# Patient Record
Sex: MALE | Race: WHITE | Employment: OTHER | URBAN - METROPOLITAN AREA
[De-identification: names, ages, dates, MRNs, and addresses within clinical notes are randomized per-mention and may not be internally consistent; named-entity substitution may affect disease eponyms.]

---

## 2022-11-14 ENCOUNTER — OFFICE VISIT (OUTPATIENT)
Dept: UROLOGY | Age: 87
End: 2022-11-14
Payer: MEDICARE

## 2022-11-14 DIAGNOSIS — N20.1 LEFT URETERAL STONE: Primary | ICD-10-CM

## 2022-11-14 DIAGNOSIS — N40.1 BPH WITH OBSTRUCTION/LOWER URINARY TRACT SYMPTOMS: ICD-10-CM

## 2022-11-14 DIAGNOSIS — N13.8 BPH WITH OBSTRUCTION/LOWER URINARY TRACT SYMPTOMS: ICD-10-CM

## 2022-11-14 DIAGNOSIS — N20.0 RENAL STONES: ICD-10-CM

## 2022-11-14 DIAGNOSIS — N39.0 RECURRENT UTI: ICD-10-CM

## 2022-11-14 LAB
BILIRUBIN, URINE, POC: NEGATIVE
BLOOD URINE, POC: NORMAL
GLUCOSE URINE, POC: 100
KETONES, URINE, POC: NEGATIVE
LEUKOCYTE ESTERASE, URINE, POC: NORMAL
NITRITE, URINE, POC: NEGATIVE
PH, URINE, POC: 7 (ref 4.6–8)
PROTEIN,URINE, POC: 100
SPECIFIC GRAVITY, URINE, POC: 1.02 (ref 1–1.03)
URINALYSIS CLARITY, POC: NORMAL
URINALYSIS COLOR, POC: NORMAL
UROBILINOGEN, POC: 0.2

## 2022-11-14 PROCEDURE — 81003 URINALYSIS AUTO W/O SCOPE: CPT | Performed by: UROLOGY

## 2022-11-14 PROCEDURE — 74018 RADEX ABDOMEN 1 VIEW: CPT | Performed by: UROLOGY

## 2022-11-14 PROCEDURE — 99204 OFFICE O/P NEW MOD 45 MIN: CPT | Performed by: UROLOGY

## 2022-11-14 PROCEDURE — 1123F ACP DISCUSS/DSCN MKR DOCD: CPT | Performed by: UROLOGY

## 2022-11-14 RX ORDER — TAMSULOSIN HYDROCHLORIDE 0.4 MG/1
CAPSULE ORAL
COMMUNITY
Start: 2022-11-02 | End: 2022-11-30

## 2022-11-14 RX ORDER — FINASTERIDE 5 MG/1
5 TABLET, FILM COATED ORAL DAILY
Qty: 90 TABLET | Refills: 3 | Status: SHIPPED | OUTPATIENT
Start: 2022-11-14

## 2022-11-14 RX ORDER — NITROFURANTOIN MACROCRYSTALS 50 MG/1
50 CAPSULE ORAL DAILY
Qty: 14 CAPSULE | Refills: 1 | Status: SHIPPED | OUTPATIENT
Start: 2022-11-14 | End: 2022-11-30

## 2022-11-14 ASSESSMENT — ENCOUNTER SYMPTOMS
EYES NEGATIVE: 1
RESPIRATORY NEGATIVE: 1

## 2022-11-14 NOTE — H&P (VIEW-ONLY)
[facility]  [address] [facility]    [address]  [facility], [address] [facility]  [phone]          Юлия Vergara  : 1935    Chief Complaint   Patient presents with    Nephrolithiasis          HPI     Юлия Vergara is a 80 y.o. male with h/o bph and recurrent uti's who presented to [facility] 10/28/22 and was found to have UTI and 5 m LEFT proximal ureteral stone. LEFT ureteral stent was placed urgency 10/29/22 and he is currently scheduled for cystoscopy, LEFT ureteroscopy, laser lithotripsy, LEFT ureteral stent exchange with Dr. Meghna Gomez on 22. History reviewed. No pertinent past medical history. History reviewed. No pertinent surgical history. Current Outpatient Medications   Medication Sig Dispense Refill    tamsulosin (FLOMAX) 0.4 MG capsule       finasteride (PROSCAR) 5 MG tablet Take 1 tablet by mouth daily 90 tablet 3    nitrofurantoin (MACRODANTIN) 50 MG capsule Take 1 capsule by mouth daily 14 capsule 1    amLODIPine (NORVASC) 10 MG tablet Take by mouth daily      losartan (COZAAR) 50 MG tablet TAKE 1 TABLET BY MOUTH EVERY DAY      lisinopril (PRINIVIL;ZESTRIL) 10 MG tablet Take by mouth daily (Patient not taking: Reported on 2022)       No current facility-administered medications for this visit.      Not on File  Social History     Socioeconomic History    Marital status:      Spouse name: Not on file    Number of children: Not on file    Years of education: Not on file    Highest education level: Not on file   Occupational History    Not on file   Tobacco Use    Smoking status: Not on file    Smokeless tobacco: Not on file   Substance and Sexual Activity    Alcohol use: Not on file    Drug use: Not on file    Sexual activity: Not on file   Other Topics Concern    Not on file   Social History Narrative    Not on file     Social Determinants of Health     Financial Resource Strain: Not on file   Food Insecurity: Not on file   Transportation Needs: Not on file   Physical Activity: Not on file   Stress: Not on file   Social Connections: Not on file   Intimate Partner Violence: Not on file   Housing Stability: Not on file     History reviewed. No pertinent family history. Review of Systems  Constitutional: Negative  Skin: Negative skin ROS  Eyes: Eyes negative  ENT:  Negative for high frequency hearing loss. Respiratory: Respiratory negative  Cardiovascular: Positive for chest pain. GI: Neg GI ROS  Genitourinary: Positive for recurrent UTIs, nocturia, slower stream, urgency, leakage w/ urge and incomplete emptying. Musculoskeletal: Musculoskeletal negative  Neurological: Positive for dizziness. Psychological: Neg psych ROS  Endocrine: Endocrine negative  Hem/Lymphatic: Positive for frequent infections. Urinalysis  UA - Dipstick  Results for orders placed or performed in visit on 11/14/22   AMB POC URINALYSIS DIP STICK AUTO W/O MICRO   Result Value Ref Range    Color, Urine, POC      Clarity, Urine, POC      Glucose, Urine,  Negative    Bilirubin, Urine, POC Negative Negative    Ketones, Urine, POC Negative Negative    Specific Gravity, Urine, POC 1.020 1.001 - 1.035    Blood, Urine, POC Moderate Negative    pH, Urine, POC 7.0 4.6 - 8.0    Protein, Urine,  Negative    Urobilinogen, POC 0.2     Nitrate, Urine, POC Negative Negative    Leukocyte Esterase, Urine, POC Trace Negative       There were no vitals taken for this visit. GENERAL: NAD, ALERT, A&O x 3, GAIT NORMAL  CARDIAC: regular rate and rhythm  CHEST AND LUNG: Easy work of breathing, clear to auscultation bilaterally  ABDOMEN: soft, non tender, non distended, + bowel sounds, no organomegaly, no palpable masses  NEUROLOGIC: cranial nerves 2-12 grossly intact     KUB: 5 mm LLP stone and L stent        Assessment and Plan    ICD-10-CM    1. Left ureteral stone  N20.1       2.  Renal stones  N20.0 AMB POC URINALYSIS DIP STICK AUTO W/O MICRO     KUB - POC for Rogerio ONLY      3. BPH with obstruction/lower urinary tract symptoms  N40.1     N13.8       4. Recurrent UTI  N39.0           In regards to his stone, it appears to have moved back into the LLP. He will be scheduled for cystoscopy, LEFT ureteroscopy, laser lithotripsy, LEFT ureteral stent exchange. At that time, prostate can be evaluated as well for potential future TURP. He will begin finasteride today. In regards to recurrent UTI, he will begin 50 mg macrodantin to be taken until cystoscopy, LEFT ureteroscopy, laser lithotripsy, LEFT ureteral stent exchange can be performed.

## 2022-11-14 NOTE — PROGRESS NOTES
[facility]  [address] [place] [address]  [place], [address] [facility]  [phone]          Cullen Johnston  : 1935    Chief Complaint   Patient presents with    Nephrolithiasis          HPI     Cullen Johnston is a 80 y.o. male with h/o bph and recurrent uti's who presented to [facility] 10/28/22 and was found to have UTI and 5 m LEFT proximal ureteral stone. LEFT ureteral stent was placed urgency 10/29/22 and he is currently scheduled for cystoscopy, LEFT ureteroscopy, laser lithotripsy, LEFT ureteral stent exchange with Dr. Francheska Hinson on 22. History reviewed. No pertinent past medical history. History reviewed. No pertinent surgical history. Current Outpatient Medications   Medication Sig Dispense Refill    tamsulosin (FLOMAX) 0.4 MG capsule       finasteride (PROSCAR) 5 MG tablet Take 1 tablet by mouth daily 90 tablet 3    nitrofurantoin (MACRODANTIN) 50 MG capsule Take 1 capsule by mouth daily 14 capsule 1    amLODIPine (NORVASC) 10 MG tablet Take by mouth daily      losartan (COZAAR) 50 MG tablet TAKE 1 TABLET BY MOUTH EVERY DAY      lisinopril (PRINIVIL;ZESTRIL) 10 MG tablet Take by mouth daily (Patient not taking: Reported on 2022)       No current facility-administered medications for this visit.      Not on File  Social History     Socioeconomic History    Marital status:      Spouse name: Not on file    Number of children: Not on file    Years of education: Not on file    Highest education level: Not on file   Occupational History    Not on file   Tobacco Use    Smoking status: Not on file    Smokeless tobacco: Not on file   Substance and Sexual Activity    Alcohol use: Not on file    Drug use: Not on file    Sexual activity: Not on file   Other Topics Concern    Not on file   Social History Narrative    Not on file     Social Determinants of Health     Financial Resource Strain: Not on file   Food Insecurity: Not on file   Transportation Needs: Not on file   Physical Activity: Not on file   Stress: Not on file   Social Connections: Not on file   Intimate Partner Violence: Not on file   Housing Stability: Not on file     History reviewed. No pertinent family history. Review of Systems  Constitutional: Negative  Skin: Negative skin ROS  Eyes: Eyes negative  ENT:  Negative for high frequency hearing loss. Respiratory: Respiratory negative  Cardiovascular: Positive for chest pain. GI: Neg GI ROS  Genitourinary: Positive for recurrent UTIs, nocturia, slower stream, urgency, leakage w/ urge and incomplete emptying. Musculoskeletal: Musculoskeletal negative  Neurological: Positive for dizziness. Psychological: Neg psych ROS  Endocrine: Endocrine negative  Hem/Lymphatic: Positive for frequent infections. Urinalysis  UA - Dipstick  Results for orders placed or performed in visit on 11/14/22   AMB POC URINALYSIS DIP STICK AUTO W/O MICRO   Result Value Ref Range    Color, Urine, POC      Clarity, Urine, POC      Glucose, Urine,  Negative    Bilirubin, Urine, POC Negative Negative    Ketones, Urine, POC Negative Negative    Specific Gravity, Urine, POC 1.020 1.001 - 1.035    Blood, Urine, POC Moderate Negative    pH, Urine, POC 7.0 4.6 - 8.0    Protein, Urine,  Negative    Urobilinogen, POC 0.2     Nitrate, Urine, POC Negative Negative    Leukocyte Esterase, Urine, POC Trace Negative       There were no vitals taken for this visit. GENERAL: NAD, ALERT, A&O x 3, GAIT NORMAL  CARDIAC: regular rate and rhythm  CHEST AND LUNG: Easy work of breathing, clear to auscultation bilaterally  ABDOMEN: soft, non tender, non distended, + bowel sounds, no organomegaly, no palpable masses  NEUROLOGIC: cranial nerves 2-12 grossly intact     KUB: 5 mm LLP stone and L stent        Assessment and Plan    ICD-10-CM    1. Left ureteral stone  N20.1       2.  Renal stones  N20.0 AMB POC URINALYSIS DIP STICK AUTO W/O MICRO     KUB - POC for Rogerio ONLY      3. BPH with obstruction/lower urinary tract symptoms  N40.1     N13.8       4. Recurrent UTI  N39.0           In regards to his stone, it appears to have moved back into the LLP. He will be scheduled for cystoscopy, LEFT ureteroscopy, laser lithotripsy, LEFT ureteral stent exchange. At that time, prostate can be evaluated as well for potential future TURP. He will begin finasteride today. In regards to recurrent UTI, he will begin 50 mg macrodantin to be taken until cystoscopy, LEFT ureteroscopy, laser lithotripsy, LEFT ureteral stent exchange can be performed.

## 2022-11-21 ENCOUNTER — TELEPHONE (OUTPATIENT)
Dept: UROLOGY | Age: 87
End: 2022-11-21

## 2022-11-21 NOTE — TELEPHONE ENCOUNTER
----- Message from Ynes Granados MD sent at 11/14/2022  2:15 PM EST -----  Regarding: schedule  Hospital: [address] [place] [address]     Surgeon: donavan  Assist: NONE    Diagnosis: LLP renal stone    Procedure: cystoscopy, LEFT ureteroscopy, laser lithotripsy, LEFT ureteral stent exchange    Posting time: 1 hour    Special Instruments Needed:  NONE    Anesthesia: GENERAL    Labs: CBC, BMP    Tests: EKG per anesthesia    Blood: NONE    Bowel Prep: NONE    Special Instructions:     Orders/HandP: done/done    Follow up appointment: 5-7 days after with me with kub.

## 2022-11-28 ENCOUNTER — TELEPHONE (OUTPATIENT)
Dept: UROLOGY | Age: 87
End: 2022-11-28

## 2022-11-28 PROBLEM — N20.0 KIDNEY STONE: Status: ACTIVE | Noted: 2022-11-28

## 2022-11-28 NOTE — TELEPHONE ENCOUNTER
The patient is asking If he will have the antibiotic refilled and also if you are going to order a urine and culture to be done before surgery as he states he has chronic infections and wants to make ok to have surgery this Friday 12/2

## 2022-11-28 NOTE — TELEPHONE ENCOUNTER
Procedures: Procedure(s):   CYSTOSCOPY,LEFT URETEROSCOPY, LASER LITHOTRIPSY, LEFT URETERAL STENT PLACEMENT   Date: 12/2/2022   Time: 1038   Location: [facility] MAIN OR 01 CYSTO

## 2022-11-29 ENCOUNTER — OFFICE VISIT (OUTPATIENT)
Dept: UROLOGY | Age: 87
End: 2022-11-29
Payer: MEDICARE

## 2022-11-29 DIAGNOSIS — N39.0 RECURRENT UTI: Primary | ICD-10-CM

## 2022-11-29 LAB
BILIRUBIN, URINE, POC: NEGATIVE
BLOOD URINE, POC: NORMAL
GLUCOSE URINE, POC: 100
KETONES, URINE, POC: NEGATIVE
LEUKOCYTE ESTERASE, URINE, POC: NORMAL
NITRITE, URINE, POC: NEGATIVE
PH, URINE, POC: 5.5 (ref 4.6–8)
PROTEIN,URINE, POC: 30
SPECIFIC GRAVITY, URINE, POC: 1.01 (ref 1–1.03)
URINALYSIS CLARITY, POC: NORMAL
URINALYSIS COLOR, POC: NORMAL
UROBILINOGEN, POC: NORMAL

## 2022-11-29 PROCEDURE — 81003 URINALYSIS AUTO W/O SCOPE: CPT | Performed by: UROLOGY

## 2022-11-29 RX ORDER — CIPROFLOXACIN 500 MG/1
500 TABLET, FILM COATED ORAL 2 TIMES DAILY
Qty: 14 TABLET | Refills: 0 | Status: SHIPPED | OUTPATIENT
Start: 2022-11-29 | End: 2022-11-30

## 2022-11-29 NOTE — PROGRESS NOTES
UA - Dipstick  Results for orders placed or performed in visit on 11/29/22   AMB POC URINALYSIS DIP STICK AUTO W/O MICRO   Result Value Ref Range    Color (UA POC)      Clarity (UA POC)      Glucose, Urine,  Negative    Bilirubin, Urine, POC Negative Negative    KETONES, Urine, POC Negative Negative    Specific Gravity, Urine, POC 1.015 1.001 - 1.035    Blood (UA POC) Small Negative    pH, Urine, POC 5.5 4.6 - 8.0    Protein, Urine, POC 30 Negative    Urobilinogen, POC Normal     Nitrite, Urine, POC Negative Negative    Leukocyte Esterase, Urine, POC Small Negative   Results for orders placed or performed in visit on 11/14/22   AMB POC URINALYSIS DIP STICK AUTO W/O MICRO   Result Value Ref Range    Color, Urine, POC      Clarity, Urine, POC      Glucose, Urine,  Negative    Bilirubin, Urine, POC Negative Negative    Ketones, Urine, POC Negative Negative    Specific Gravity, Urine, POC 1.020 1.001 - 1.035    Blood, Urine, POC Moderate Negative    pH, Urine, POC 7.0 4.6 - 8.0    Protein, Urine,  Negative    Urobilinogen, POC 0.2     Nitrate, Urine, POC Negative Negative    Leukocyte Esterase, Urine, POC Trace Negative       UA - Micro  WBC - 5-10  RBC - 0  Bacteria - 0  Epith - 0      Requested Prescriptions      No prescriptions requested or ordered in this encounter     Plan    Diagnosis Orders   1. Recurrent UTI  AMB POC URINALYSIS DIP STICK AUTO W/O MICRO        Urine culture was sent and cipro eprescribed. No change to surgical plan.

## 2022-11-30 RX ORDER — CIPROFLOXACIN 500 MG/1
500 TABLET, FILM COATED ORAL 2 TIMES DAILY
Qty: 14 TABLET | Refills: 0 | Status: SHIPPED | OUTPATIENT
Start: 2022-11-30 | End: 2022-12-07

## 2022-11-30 NOTE — PRE-PROCEDURE INSTRUCTIONS
Patient verified name and . Order for consent was found in EHR and matches case posting; patient verifies procedure. Type IB surgery, phone assessment complete. Orders were received. Labs per surgeon: none found for PAT. Labs per anesthesia protocol: POC Potassium DOS. Patient answered medical/surgical history questions at their best of ability. All prior to admission medications documented in [facility]. Patient instructed to take the following medications the day of surgery according to anesthesia guidelines with a small sip of water: Amlodipine, Nitrofuantoin, Finasteride. On the day before surgery please take Acetaminophen 1000mg in the morning and then again before bed. You may substitute for Tylenol 650 mg. Hold all vitamins 7 days prior to surgery and NSAIDS 5 days prior to surgery. Prescription meds to hold:Losartan. Patient instructed on the following:    > Arrive at [facility], time of arrival to be called the day before by 1700  > NPO after midnight, unless otherwise indicated, including gum, mints, and ice chips  > Responsible adult must drive patient to the hospital, stay during surgery, and patient will need supervision 24 hours after anesthesia  > Use antibacterial soap in shower the night before surgery and on the morning of surgery  > All piercings must be removed prior to arrival.    > Leave all valuables (money and jewelry) at home but bring insurance card and ID on DOS.   > You may be required to pay a deductible or co-pay on the day of your procedure. You can pre-pay by calling [phone] if your surgery is at the [facility] or [phone] if your surgery is at the [facility]. > Do not wear make-up, nail polish, lotions, cologne, perfumes, powders, or oil on skin. Artificial nails are not permitted.

## 2022-12-01 ENCOUNTER — ANESTHESIA EVENT (OUTPATIENT)
Dept: SURGERY | Age: 87
End: 2022-12-01

## 2022-12-01 NOTE — PROGRESS NOTES
Pre-procedure call completed. Instructed to arrive at [place] [address] on 12/02/2022 for scheduled procedure. Instructed to remain NPO after MN.

## 2022-12-02 ENCOUNTER — ANESTHESIA (OUTPATIENT)
Dept: SURGERY | Age: 87
End: 2022-12-02

## 2022-12-02 ENCOUNTER — HOSPITAL ENCOUNTER (OUTPATIENT)
Age: 87
Setting detail: OUTPATIENT SURGERY
Discharge: HOME | End: 2022-12-02
Attending: UROLOGY | Admitting: UROLOGY
Payer: MEDICARE

## 2022-12-02 VITALS
RESPIRATION RATE: 16 BRPM | DIASTOLIC BLOOD PRESSURE: 91 MMHG | WEIGHT: 191.38 LBS | HEART RATE: 55 BPM | HEIGHT: 68 IN | BODY MASS INDEX: 29.01 KG/M2 | OXYGEN SATURATION: 97 % | SYSTOLIC BLOOD PRESSURE: 157 MMHG | TEMPERATURE: 98.1 F

## 2022-12-02 DIAGNOSIS — N20.0 KIDNEY STONE: ICD-10-CM

## 2022-12-02 LAB
ANION GAP SERPL CALC-SCNC: 5 MMOL/L (ref 2–11)
BUN SERPL-MCNC: 48 MG/DL (ref 8–23)
CALCIUM SERPL-MCNC: 8.9 MG/DL (ref 8.3–10.4)
CHLORIDE SERPL-SCNC: 112 MMOL/L (ref 101–110)
CO2 SERPL-SCNC: 24 MMOL/L (ref 21–32)
CREAT SERPL-MCNC: 2.6 MG/DL (ref 0.8–1.5)
ERYTHROCYTE [DISTWIDTH] IN BLOOD BY AUTOMATED COUNT: 14 % (ref 11.9–14.6)
GLUCOSE BLD STRIP.AUTO-MCNC: 137 MG/DL (ref 65–100)
GLUCOSE SERPL-MCNC: 151 MG/DL (ref 65–100)
HCT VFR BLD AUTO: 41.3 % (ref 41.1–50.3)
HGB BLD-MCNC: 13.4 G/DL (ref 13.6–17.2)
MCH RBC QN AUTO: 29.3 PG (ref 26.1–32.9)
MCHC RBC AUTO-ENTMCNC: 32.4 G/DL (ref 31.4–35)
MCV RBC AUTO: 90.4 FL (ref 82–102)
NRBC # BLD: 0 K/UL (ref 0–0.2)
PLATELET # BLD AUTO: 197 K/UL (ref 150–450)
PMV BLD AUTO: 10.2 FL (ref 9.4–12.3)
POTASSIUM SERPL-SCNC: 4.5 MMOL/L (ref 3.5–5.1)
RBC # BLD AUTO: 4.57 M/UL (ref 4.23–5.6)
SERVICE CMNT-IMP: ABNORMAL
SODIUM SERPL-SCNC: 141 MMOL/L (ref 133–143)
WBC # BLD AUTO: 6.2 K/UL (ref 4.3–11.1)

## 2022-12-02 PROCEDURE — C1769 GUIDE WIRE: HCPCS | Performed by: UROLOGY

## 2022-12-02 PROCEDURE — 3700000001 HC ADD 15 MINUTES (ANESTHESIA): Performed by: UROLOGY

## 2022-12-02 PROCEDURE — C2617 STENT, NON-COR, TEM W/O DEL: HCPCS | Performed by: UROLOGY

## 2022-12-02 PROCEDURE — 6360000002 HC RX W HCPCS: Performed by: NURSE ANESTHETIST, CERTIFIED REGISTERED

## 2022-12-02 PROCEDURE — 2709999900 HC NON-CHARGEABLE SUPPLY: Performed by: UROLOGY

## 2022-12-02 PROCEDURE — 3600000014 HC SURGERY LEVEL 4 ADDTL 15MIN: Performed by: UROLOGY

## 2022-12-02 PROCEDURE — 7100000000 HC PACU RECOVERY - FIRST 15 MIN: Performed by: UROLOGY

## 2022-12-02 PROCEDURE — 2720000010 HC SURG SUPPLY STERILE: Performed by: UROLOGY

## 2022-12-02 PROCEDURE — 7100000010 HC PHASE II RECOVERY - FIRST 15 MIN: Performed by: UROLOGY

## 2022-12-02 PROCEDURE — 82962 GLUCOSE BLOOD TEST: CPT

## 2022-12-02 PROCEDURE — C1894 INTRO/SHEATH, NON-LASER: HCPCS | Performed by: UROLOGY

## 2022-12-02 PROCEDURE — 2580000003 HC RX 258: Performed by: ANESTHESIOLOGY

## 2022-12-02 PROCEDURE — 7100000011 HC PHASE II RECOVERY - ADDTL 15 MIN: Performed by: UROLOGY

## 2022-12-02 PROCEDURE — 3600000004 HC SURGERY LEVEL 4 BASE: Performed by: UROLOGY

## 2022-12-02 PROCEDURE — 85027 COMPLETE CBC AUTOMATED: CPT

## 2022-12-02 PROCEDURE — 6370000000 HC RX 637 (ALT 250 FOR IP): Performed by: ANESTHESIOLOGY

## 2022-12-02 PROCEDURE — 7100000001 HC PACU RECOVERY - ADDTL 15 MIN: Performed by: UROLOGY

## 2022-12-02 PROCEDURE — 3700000000 HC ANESTHESIA ATTENDED CARE: Performed by: UROLOGY

## 2022-12-02 PROCEDURE — 2500000003 HC RX 250 WO HCPCS: Performed by: NURSE ANESTHETIST, CERTIFIED REGISTERED

## 2022-12-02 PROCEDURE — 80048 BASIC METABOLIC PNL TOTAL CA: CPT

## 2022-12-02 PROCEDURE — 52356 CYSTO/URETERO W/LITHOTRIPSY: CPT | Performed by: UROLOGY

## 2022-12-02 PROCEDURE — 6360000002 HC RX W HCPCS: Performed by: UROLOGY

## 2022-12-02 DEVICE — URETERAL STENT
Type: IMPLANTABLE DEVICE | Site: URETER | Status: FUNCTIONAL
Brand: PERCUFLEX™ PLUS

## 2022-12-02 RX ORDER — SODIUM CHLORIDE 9 MG/ML
INJECTION, SOLUTION INTRAVENOUS PRN
Status: DISCONTINUED | OUTPATIENT
Start: 2022-12-02 | End: 2022-12-02 | Stop reason: HOSPADM

## 2022-12-02 RX ORDER — ACETAMINOPHEN 500 MG
1000 TABLET ORAL ONCE
Status: COMPLETED | OUTPATIENT
Start: 2022-12-02 | End: 2022-12-02

## 2022-12-02 RX ORDER — ONDANSETRON 2 MG/ML
INJECTION INTRAMUSCULAR; INTRAVENOUS PRN
Status: DISCONTINUED | OUTPATIENT
Start: 2022-12-02 | End: 2022-12-02 | Stop reason: SDUPTHER

## 2022-12-02 RX ORDER — ROCURONIUM BROMIDE 10 MG/ML
INJECTION, SOLUTION INTRAVENOUS PRN
Status: DISCONTINUED | OUTPATIENT
Start: 2022-12-02 | End: 2022-12-02 | Stop reason: SDUPTHER

## 2022-12-02 RX ORDER — LIDOCAINE HYDROCHLORIDE 20 MG/ML
INJECTION, SOLUTION EPIDURAL; INFILTRATION; INTRACAUDAL; PERINEURAL PRN
Status: DISCONTINUED | OUTPATIENT
Start: 2022-12-02 | End: 2022-12-02 | Stop reason: SDUPTHER

## 2022-12-02 RX ORDER — SODIUM CHLORIDE, SODIUM LACTATE, POTASSIUM CHLORIDE, CALCIUM CHLORIDE 600; 310; 30; 20 MG/100ML; MG/100ML; MG/100ML; MG/100ML
INJECTION, SOLUTION INTRAVENOUS CONTINUOUS
Status: DISCONTINUED | OUTPATIENT
Start: 2022-12-02 | End: 2022-12-02 | Stop reason: HOSPADM

## 2022-12-02 RX ORDER — OXYCODONE HYDROCHLORIDE 5 MG/1
5 TABLET ORAL PRN
Status: DISCONTINUED | OUTPATIENT
Start: 2022-12-02 | End: 2022-12-02 | Stop reason: HOSPADM

## 2022-12-02 RX ORDER — HYDROMORPHONE HCL 110MG/55ML
0.25 PATIENT CONTROLLED ANALGESIA SYRINGE INTRAVENOUS EVERY 5 MIN PRN
Status: DISCONTINUED | OUTPATIENT
Start: 2022-12-02 | End: 2022-12-02 | Stop reason: HOSPADM

## 2022-12-02 RX ORDER — NEOSTIGMINE METHYLSULFATE 1 MG/ML
INJECTION, SOLUTION INTRAVENOUS PRN
Status: DISCONTINUED | OUTPATIENT
Start: 2022-12-02 | End: 2022-12-02 | Stop reason: SDUPTHER

## 2022-12-02 RX ORDER — SODIUM CHLORIDE 0.9 % (FLUSH) 0.9 %
5-40 SYRINGE (ML) INJECTION PRN
Status: DISCONTINUED | OUTPATIENT
Start: 2022-12-02 | End: 2022-12-02 | Stop reason: HOSPADM

## 2022-12-02 RX ORDER — SODIUM CHLORIDE 0.9 % (FLUSH) 0.9 %
5-40 SYRINGE (ML) INJECTION EVERY 12 HOURS SCHEDULED
Status: DISCONTINUED | OUTPATIENT
Start: 2022-12-02 | End: 2022-12-02 | Stop reason: HOSPADM

## 2022-12-02 RX ORDER — PROPOFOL 10 MG/ML
INJECTION, EMULSION INTRAVENOUS PRN
Status: DISCONTINUED | OUTPATIENT
Start: 2022-12-02 | End: 2022-12-02 | Stop reason: SDUPTHER

## 2022-12-02 RX ORDER — LIDOCAINE HYDROCHLORIDE 10 MG/ML
1 INJECTION, SOLUTION INFILTRATION; PERINEURAL
Status: DISCONTINUED | OUTPATIENT
Start: 2022-12-02 | End: 2022-12-02 | Stop reason: HOSPADM

## 2022-12-02 RX ORDER — HYDROMORPHONE HCL 110MG/55ML
0.5 PATIENT CONTROLLED ANALGESIA SYRINGE INTRAVENOUS EVERY 5 MIN PRN
Status: DISCONTINUED | OUTPATIENT
Start: 2022-12-02 | End: 2022-12-02 | Stop reason: HOSPADM

## 2022-12-02 RX ORDER — ONDANSETRON 2 MG/ML
4 INJECTION INTRAMUSCULAR; INTRAVENOUS
Status: DISCONTINUED | OUTPATIENT
Start: 2022-12-02 | End: 2022-12-02 | Stop reason: HOSPADM

## 2022-12-02 RX ORDER — OXYCODONE HYDROCHLORIDE 5 MG/1
10 TABLET ORAL PRN
Status: DISCONTINUED | OUTPATIENT
Start: 2022-12-02 | End: 2022-12-02 | Stop reason: HOSPADM

## 2022-12-02 RX ORDER — GLYCOPYRROLATE 0.2 MG/ML
INJECTION INTRAMUSCULAR; INTRAVENOUS PRN
Status: DISCONTINUED | OUTPATIENT
Start: 2022-12-02 | End: 2022-12-02 | Stop reason: SDUPTHER

## 2022-12-02 RX ADMIN — SODIUM CHLORIDE, POTASSIUM CHLORIDE, SODIUM LACTATE AND CALCIUM CHLORIDE: 600; 310; 30; 20 INJECTION, SOLUTION INTRAVENOUS at 08:56

## 2022-12-02 RX ADMIN — GLYCOPYRROLATE 0.2 MG: 0.2 INJECTION, SOLUTION INTRAMUSCULAR; INTRAVENOUS at 11:23

## 2022-12-02 RX ADMIN — PROPOFOL 150 MG: 10 INJECTION, EMULSION INTRAVENOUS at 10:49

## 2022-12-02 RX ADMIN — LIDOCAINE HYDROCHLORIDE 80 MG: 20 INJECTION, SOLUTION EPIDURAL; INFILTRATION; INTRACAUDAL; PERINEURAL at 10:49

## 2022-12-02 RX ADMIN — GLYCOPYRROLATE 0.8 MG: 0.2 INJECTION, SOLUTION INTRAMUSCULAR; INTRAVENOUS at 11:21

## 2022-12-02 RX ADMIN — Medication 2 G: at 10:55

## 2022-12-02 RX ADMIN — ONDANSETRON 4 MG: 2 INJECTION INTRAMUSCULAR; INTRAVENOUS at 11:21

## 2022-12-02 RX ADMIN — Medication 5 MG: at 11:21

## 2022-12-02 RX ADMIN — ACETAMINOPHEN 1000 MG: 500 TABLET ORAL at 08:55

## 2022-12-02 RX ADMIN — FENTANYL CITRATE 50 MCG: 50 INJECTION INTRAMUSCULAR; INTRAVENOUS at 10:49

## 2022-12-02 RX ADMIN — ROCURONIUM BROMIDE 40 MG: 50 INJECTION, SOLUTION INTRAVENOUS at 10:49

## 2022-12-02 ASSESSMENT — PAIN - FUNCTIONAL ASSESSMENT: PAIN_FUNCTIONAL_ASSESSMENT: 0-10

## 2022-12-02 NOTE — DISCHARGE INSTRUCTIONS
Ureteral Stent Placement: What to Expect at [address] [place] [address]  A ureteral (say \"you-REE-ter-ul\") stent is a thin, hollow tube that is placed in the ureter to help urine pass from the kidney into the bladder. Ureters are the tubes that connect the kidneys to the bladder. You may have a small amount of blood in your urine for 1 to 3 days after the procedure. While the stent is in place, you may have to urinate more often, feel a sudden need to urinate, or feel like you cannot completely empty your bladder. You may feel some pain when you urinate or do strenuous activity. You also may notice a small amount of blood in your urine after strenuous activities. These side effects usually do not prevent people from doing their normal daily activities. You may have a string attached to your stent. Do not to pull the string unless the doctor tells you to. The doctor will use the string to pull out the stent when you no longer need it. After the procedure, urine may flow better from your kidneys to your bladder. A ureteral stent may be left in place for several days or for as long as several months. Your doctor will take it out when you no longer need it. Cystoscopy: What to Expect at [address] [facility]  A cystoscopy is a procedure that lets a doctor look inside of the bladder and the urethra. The urethra is the tube that carries urine from the bladder to outside the body. The doctor uses a thin, lighted tube called a cystoscope to look for kidney or bladder stones, tumors, bleeding, or infection. After the cystoscopy, your urethra may be sore at first, and it may burn when you urinate for the first few days after the procedure. You may feel the need to urinate more often, and your urine may be pink. These symptoms should get better in 1 or 2 days. You will probably be able to go back to work or most of your usual activities in 1 or 2 days. How can you care for yourself at home? Activity  Rest when you feel tired. Getting enough sleep will help you recover. Try to walk each day. Start by walking a little more than you did the day before. Bit by bit, increase the amount you walk. Walking boosts blood flow and helps prevent pneumonia and constipation. Avoid strenuous activities, such as bicycle riding, jogging, weight lifting, or aerobic exercise, until your doctor says it is okay. Ask your doctor when you can drive again. Most people are able to return to work within 1 or 2 days after the procedure. You may shower and take baths as usual.  Ask your doctor when it is okay for you to have sex. Diet  You can eat your normal diet. If your stomach is upset, try bland, low-fat foods like plain rice, broiled chicken, toast, and yogurt. Drink plenty of fluids (unless your doctor tells you not to). Medicines  Take pain medicines exactly as directed. If the doctor gave you a prescription medicine for pain, take it as prescribed. If you are not taking a prescription pain medicine, ask your doctor if you can take an over-the-counter medicine. If you think your pain medicine is making you sick to your stomach: Take your medicine after meals (unless your doctor has told you not to). Ask your doctor for a different pain medicine. If your doctor prescribed antibiotics, take them as directed. Do not stop taking them just because you feel better. You need to take the full course of antibiotics. Medication Interaction:  During your procedure you potentially received a medication or medications which may reduce the effectiveness of oral contraceptives. Please consider other forms of contraception for 1 month following your procedure if you are currently using oral contraceptives as your primary form of birth control. In addition to this, we recommend continuing your oral contraceptive as prescribed, unless otherwise instructed by your physician, during this time. Follow-up care is a key part of your treatment and safety.  Be sure to make and go to all appointments, and call your doctor if you are having problems. It's also a good idea to know your test results and keep a list of the medicines you take. When should you call for help? Call 911 anytime you think you may need emergency care. For example, call if:  You passed out (lost consciousness). You have severe trouble breathing. You have sudden chest pain and shortness of breath, or you cough up blood. You have severe belly pain. Call your doctor now or seek immediate medical care if:  You are sick to your stomach or cannot keep fluids down. Your urine is still red or you see blood clots after you have urinated several times. You have trouble passing urine or stool, especially if you have pain or swelling in your lower belly. You have signs of a blood clot, such as:  Pain in your calf, back of the knee, thigh, or groin. Redness and swelling in your leg or groin. You develop a fever or severe chills. You have pain in your back just below your rib cage. This is called flank pain. Watch closely for changes in your health, and be sure to contact your doctor if:  A burning feeling is normal for a day or two after the test, but call if it does not get better. You have a frequent urge to urinate but can pass only small amounts of urine. It is normal for the urine to have a pinkish color for a few days after the test, but call if it does not get better or if your urine is cloudy, smells bad, or has pus in it.     After general anesthesia or intravenous sedation, for 24 hours or while taking prescription Narcotics:  Limit your activities  A responsible adult needs to be with you for the next 24 hours  Do not drive and operate hazardous machinery  Do not make important personal or business decisions  Do not drink alcoholic beverages  If you have not urinated within 8 hours after discharge, and you are experiencing discomfort from urinary retention, please go to the nearest ED.  If you have sleep apnea and have a CPAP machine, please use it for all naps and sleeping. Please use caution when taking narcotics and any of your home medications that may cause drowsiness. *  Please give a list of your current medications to your Primary Care Provider. *  Please update this list whenever your medications are discontinued, doses are      changed, or new medications (including over-the-counter products) are added. *  Please carry medication information at all times in case of emergency situations. These are general instructions for a healthy lifestyle:  No smoking/ No tobacco products/ Avoid exposure to second hand smoke  Surgeon General's Warning:  Quitting smoking now greatly reduces serious risk to your health. Obesity, smoking, and sedentary lifestyle greatly increases your risk for illness  A healthy diet, regular physical exercise & weight monitoring are important for maintaining a healthy lifestyle    You may be retaining fluid if you have a history of heart failure or if you experience any of the following symptoms:  Weight gain of 3 pounds or more overnight or 5 pounds in a week, increased swelling in our hands or feet or shortness of breath while lying flat in bed. Please call your doctor as soon as you notice any of these symptoms; do not wait until your next office visit.

## 2022-12-02 NOTE — OP NOTE
[facility]  OPERATIVE REPORT    Name:  Jayden Adame  MR#:  [id number]  :  1935  ACCOUNT #:  [de-identified]  DATE OF SERVICE:  2022    PREOPERATIVE DIAGNOSIS:  Left renal calculi. POSTOPERATIVE DIAGNOSIS:  Left renal calculi. PROCEDURE PERFORMED:  Cystourethroscopy, left ureteroscopy, laser lithotripsy and left ureteral stent exchange. SURGEON:  Moreen Jeans, MD    ASSISTANT:  None. ANESTHESIA:  General.    COMPLICATIONS:  None apparent. SPECIMENS REMOVED:  None. IMPLANTS:  Left double-J ureteral stent. ESTIMATED BLOOD LOSS:  Minimal.    INDICATIONS:  This is an 27-year-old gentleman who presented to see me after having had a left ureteral stent placed at [facility] on 10/28/2022 secondary to a 5-mm left proximal ureteral stone and UTI. A KUB in my office that day revealed the stone had moved to a left lower pole location and after discussion of the risks versus benefits, he decided to move forward with today's procedure. FINDINGS:  The patient actually had two stones lying directly next to each other dependently in the lower pole of the left kidney and I was able to fragment these to until all stone fragments were less than 0.5 mm in size. PROCEDURE:  The patient was taken to operating room, placed in supine position. Anesthesia was induced via Anesthesiology service. He was repositioned in the lithotomy position and prepped and draped in sterile surgical fashion with the genitalia in sterile field. A 22-Arabic rigid cystoscope was introduced atraumatically via the urethra. His prostate was approximately 2.5 to 3 cm in length with trilobar enlargement that would be a better candidate for a TURP than a UroLift in the future if needed. Once within the bladder, he had diffuse trabeculations and he had a stent that was visualized.   The end of the stent was grasped and brought external to the urethral meatus and I advanced the wire through the stent before removing it. I then advanced this wire up until it was curling in the left renal pelvis. I next used an 8/10 coaxial dilator sheath set to gain access of a second wire and advanced this also into the left renal pelvis and then removed the dilator set. The flexible disposable ureteroscope was advanced over the working wire until its tip was within the renal pelvis and the wire was then removed. Panrenoscopy was performed into the calyces of the upper, mid and lower poles. There were only two stones within his left renal collecting system. These was sitting beside each other dependently in the lower pole. I used a 242-micron holmium laser fiber at settings of 1 joule and 8 Hz to fragment these into extremely small fragments. Once they had been fragmented as small as could be done with equipment of visualization available, the scope was removed. The cystoscope was backloaded over the safety wire to the level of left ureteral orifice and a 6-Omani x 24-cm double-J ureteral stent was deployed with good curl noted in the renal pelvis by fluoroscopy and the bladder by cystoscopy. The bladder was emptied. The cystoscope was removed and a short portion of string was left exiting urethral meatus to aid in stent removal.    PLAN:  The patient will follow up in my office in the middle of next week with a KUB and very likely have his stent removed by grasping the string exiting the urethral meatus.       Jesika Foster MD      [id number]  D:  12/02/2022 11:36  T:  12/02/2022 13:58  JOB #:  [id number]

## 2022-12-02 NOTE — INTERVAL H&P NOTE
Update History & Physical    The patient's History and Physical of November 14, 2022 was reviewed with the patient and I examined the patient. There was no change. The surgical site was confirmed by the patient and me. Plan: The risks, benefits, expected outcome, and alternative to the recommended procedure have been discussed with the patient. Patient understands and wants to proceed with the procedure.      Electronically signed by Yvonne Child MD on 12/2/2022 at 9:00 AM

## 2022-12-02 NOTE — ANESTHESIA POSTPROCEDURE EVALUATION
Department of Anesthesiology  Postprocedure Note    Patient: Ronn Thompson  MRN: [id number]  YOB: 1935  Date of evaluation: 12/2/2022      Procedure Summary     Date: 12/02/22 Room / Location: [facility] MAIN OR 01 CYSTO / [facility] MAIN OR    Anesthesia Start: 2576 Anesthesia Stop: 9918    Procedure: CYSTOSCOPY,LEFT URETEROSCOPY, LASER LITHOTRIPSY, LEFT URETERAL STENT EXCHANGE (Left) Diagnosis:       Kidney stone      (Kidney stone [N20.0])    Providers: Yo Cuellar MD Responsible Provider: Shanika Dickerson MD    Anesthesia Type: General ASA Status: 2          Anesthesia Type: General    Amberly Phase I: Amberly Score: 8    Amberly Phase II:        Anesthesia Post Evaluation    Patient location during evaluation: PACU  Patient participation: complete - patient participated  Level of consciousness: awake and alert  Airway patency: patent  Nausea & Vomiting: no nausea and no vomiting  Complications: no  Cardiovascular status: hemodynamically stable  Respiratory status: acceptable  Hydration status: euvolemic  Comments: Blood pressure (!) 146/77, pulse 60, temperature 98 °F (36.7 °C), temperature source Temporal, resp. rate 14, height 5' 8\" (1.727 m), weight 191 lb 6 oz (86.8 kg), SpO2 96 %.       Pt stable for discharge from PACU  Multimodal analgesia pain management approach

## 2022-12-02 NOTE — ANESTHESIA PRE PROCEDURE
Department of Anesthesiology  Preprocedure Note       Name:  Mary Jo Montano   Age:  80 y.o.  :  1935                                          MRN:  [id number]         Date:  2022      Surgeon: Karol Pierre):  Pio Colorado MD    Procedure: Procedure(s):  CYSTOSCOPY,LEFT URETEROSCOPY, LASER LITHOTRIPSY, LEFT URETERAL STENT EXCHANGE    Medications prior to admission:   Prior to Admission medications    Medication Sig Start Date End Date Taking?  Authorizing Provider   NITROFURANTOIN PO Take by mouth   Yes Historical Provider, MD   ciprofloxacin (CIPRO) 500 MG tablet Take 1 tablet by mouth 2 times daily for 7 days 22  Pio Colorado MD   finasteride (PROSCAR) 5 MG tablet Take 1 tablet by mouth daily 22   Pio Colorado MD   amLODIPine (NORVASC) 10 MG tablet Take by mouth daily    Ar Automatic Reconciliation   losartan (COZAAR) 50 MG tablet TAKE 1 TABLET BY MOUTH EVERY DAY 20   Ar Automatic Reconciliation       Current medications:    Current Facility-Administered Medications   Medication Dose Route Frequency Provider Last Rate Last Admin    lidocaine 1 % injection 1 mL  1 mL IntraDERmal Once PRN KACEY Romero MD        lactated ringers infusion   IntraVENous Continuous KACEY Romero MD 75 mL/hr at 22 0856 New Bag at 22 0856    sodium chloride flush 0.9 % injection 5-40 mL  5-40 mL IntraVENous 2 times per day KACEY Romero MD        sodium chloride flush 0.9 % injection 5-40 mL  5-40 mL IntraVENous PRN KACEY Romero MD        0.9 % sodium chloride infusion   IntraVENous PRN KCAEY Romero MD        ceFAZolin (ANCEF) 2000 mg in sterile water 20 mL IV syringe  2,000 mg IntraVENous On Call to 283 Ernie Roldan MD        ceFAZolin (ANCEF) 2000 mg in sterile water 20 mL IV syringe  2,000 mg IntraVENous On Call to 283 Ernie Roldan MD           Allergies:  No Known Allergies    Problem List:    Patient Active Problem List   Diagnosis Code    Kidney stone N20.0       Past Medical History:        Diagnosis Date    BPH (benign prostatic hyperplasia)     Chronic bacterial prostatitis     Chronic kidney disease (CKD), stage III (moderate) (HCC)     Deviated nasal septum     Hearing loss sensory, bilateral     Hypertension     Hypertrophy of nasal turbinates     SARAVANAN (obstructive sleep apnea)     Subdural hematoma     Type 2 diabetes mellitus ([facility] [address]     no medicaitons    Ureteral stone     Vestibular disequilibrium involving both inner ears        Past Surgical History:        Procedure Laterality Date    CATARACT EXTRACTION  2016    COLONOSCOPY      CYSTOURETHROSCOPY  10/29/2022    ELBOW SURGERY         Social History:    Social History     Tobacco Use    Smoking status: Former     Types: Pipe     Quit date:      Years since quittin.9    Smokeless tobacco: Never   Substance Use Topics    Alcohol use: Yes     Comment: occasional                                Counseling given: Not Answered      Vital Signs (Current):   Vitals:    22 1557 22 0833   BP:  (!) 170/77   Pulse:  55   Resp:  18   Temp:  98 °F (36.7 °C)   TempSrc:  Oral   SpO2:  99%   Weight: 185 lb (83.9 kg) 191 lb 6 oz (86.8 kg)   Height: 5' 8\" (1.727 m) 5' 8\" (1.727 m)                                              BP Readings from Last 3 Encounters:   22 (!) 170/77       NPO Status: Time of last liquid consumption: 0000                        Time of last solid consumption: 0000                        Date of last liquid consumption: 22                        Date of last solid food consumption: 22    BMI:   Wt Readings from Last 3 Encounters:   22 191 lb 6 oz (86.8 kg)     Body mass index is 29.1 kg/m².     CBC:   Lab Results   Component Value Date/Time    WBC 6.2 2022 08:55 AM    RBC 4.57 2022 08:55 AM    HGB 13.4 2022 08:55 AM    HCT 41.3 2022 08:55 AM    MCV 90.4 2022 08:55 AM    RDW 14.0 12/02/2022 08:55 AM     12/02/2022 08:55 AM       CMP: No results found for: NA, K, CL, CO2, BUN, CREATININE, GFRAA, AGRATIO, LABGLOM, GLUCOSE, GLU, PROT, CALCIUM, BILITOT, ALKPHOS, AST, ALT    POC Tests:   Recent Labs     12/02/22  0849   POCGLU 137*       Coags: No results found for: PROTIME, INR, APTT    HCG (If Applicable): No results found for: PREGTESTUR, PREGSERUM, HCG, HCGQUANT     ABGs: No results found for: PHART, PO2ART, EEA1ICK, AHU9AQP, BEART, O5NXIJAP     Type & Screen (If Applicable):  No results found for: LABABO, LABRH    Drug/Infectious Status (If Applicable):  No results found for: HIV, HEPCAB    COVID-19 Screening (If Applicable): No results found for: COVID19        Anesthesia Evaluation  Patient summary reviewed and Nursing notes reviewed  Airway: Mallampati: II  TM distance: >3 FB   Neck ROM: full  Mouth opening: > = 3 FB   Dental:    (+) upper dentures and lower dentures      Pulmonary: breath sounds clear to auscultation  (+) sleep apnea: on CPAP,                             Cardiovascular:  Exercise tolerance: good (>4 METS),   (+) hypertension:,         Rhythm: regular  Rate: normal                 ROS comment: Echo 11/22 - EF nml, no sig valve dz     Neuro/Psych:   Negative Neuro/Psych ROS              GI/Hepatic/Renal:   (+) renal disease: CRI and kidney stones,           Endo/Other:    (+) Diabetes (Diet-control), . Abdominal:             Vascular: negative vascular ROS. Other Findings:           Anesthesia Plan      general     ASA 2       Induction: intravenous. Anesthetic plan and risks discussed with patient and spouse.                         Louann Souza MD   12/2/2022

## 2022-12-02 NOTE — BRIEF OP NOTE
Brief Postoperative Note      Patient: Bo Bruner  YOB: 1935  MRN: [id number]    Date of Procedure: 12/2/2022    Pre-Op Diagnosis: left renal stones x 2    Post-Op Diagnosis: Same       Procedure(s):  CYSTOSCOPY,LEFT URETEROSCOPY, LASER LITHOTRIPSY, LEFT URETERAL STENT EXCHANGE    Surgeon(s):  Ynes Campos MD    Assistant:  * No surgical staff found *    Anesthesia: General    Estimated Blood Loss (mL): Minimal    Complications: None    Specimens:   * No specimens in log *    Implants:  Implant Name Type Inv.  Item Serial No.  Lot No. LRB No. Used Action   STENT URET 6FR L24CM HYDR+ GRAD CIRCUMFERENTIAL MRK LO PROF - [id number]  [facility] 6FR L24CM HYDR+ GRAD CIRCUMFERENTIAL MRK LO PROF  [facility] UROLOGY- [id number] Left 1 Implanted         Drains: * No LDAs found *    Findings: see dictation    Electronically signed by Johanne Tobias MD on 12/2/2022 at 11:26 AM

## 2022-12-02 NOTE — PERIOP NOTE
Pt and wife Мария Kern given discharge instructions at bedside, both verbalize understanding. All questions answered.

## 2022-12-02 NOTE — ANESTHESIA PROCEDURE NOTES
Airway  Date/Time: 12/2/2022 10:51 AM  Urgency: elective    Airway not difficult    General Information and Staff    Patient location during procedure: OR  Resident/CRNA: MALLORY Babb CRNA  Performed: resident/CRNA     Indications and Patient Condition  Indications for airway management: anesthesia  Spontaneous ventilation: present  Sedation level: deep  Preoxygenated: yes  Patient position: sniffing  MILS not maintained throughout      Final Airway Details  Final airway type: endotracheal airway      Successful airway: ETT  Cuffed: yes   Successful intubation technique: direct laryngoscopy  Facilitating devices/methods: intubating stylet  Endotracheal tube insertion site: oral  Blade: Ev  Blade size: #4  ETT size (mm): 8.0  Cormack-Lehane Classification: grade I - full view of glottis  Placement verified by: chest auscultation and capnometry   Measured from: lips  Number of attempts at approach: 1  Ventilation between attempts: bag mask  Number of other approaches attempted: 0

## 2022-12-03 ENCOUNTER — HOSPITAL ENCOUNTER (EMERGENCY)
Age: 87
Discharge: HOME | End: 2022-12-03
Attending: EMERGENCY MEDICINE
Payer: MEDICARE

## 2022-12-03 VITALS
HEART RATE: 74 BPM | RESPIRATION RATE: 17 BRPM | DIASTOLIC BLOOD PRESSURE: 79 MMHG | TEMPERATURE: 98 F | BODY MASS INDEX: 28.95 KG/M2 | HEIGHT: 68 IN | OXYGEN SATURATION: 98 % | WEIGHT: 191 LBS | SYSTOLIC BLOOD PRESSURE: 132 MMHG

## 2022-12-03 DIAGNOSIS — R33.8 ACUTE URINARY RETENTION: Primary | ICD-10-CM

## 2022-12-03 LAB
ALBUMIN SERPL-MCNC: 4.4 G/DL (ref 3.2–4.6)
ALBUMIN/GLOB SERPL: 1.5 {RATIO} (ref 0.4–1.6)
ALP SERPL-CCNC: 88 U/L (ref 45–117)
ALT SERPL-CCNC: 12 U/L (ref 13–61)
ANION GAP SERPL CALC-SCNC: 15 MMOL/L (ref 2–11)
APPEARANCE UR: ABNORMAL
AST SERPL-CCNC: 19 U/L (ref 15–37)
BACTERIA SPEC CULT: ABNORMAL
BACTERIA SPEC CULT: ABNORMAL
BACTERIA URNS QL MICRO: NORMAL /HPF
BILIRUB SERPL-MCNC: 0.5 MG/DL (ref 0.2–1.1)
BILIRUB UR QL: NEGATIVE
BUN SERPL-MCNC: 44 MG/DL (ref 7–18)
CALCIUM SERPL-MCNC: 9.2 MG/DL (ref 8.3–10.4)
CASTS URNS QL MICRO: 0 /LPF
CHLORIDE SERPL-SCNC: 104 MMOL/L (ref 98–107)
CO2 SERPL-SCNC: 19 MMOL/L (ref 21–32)
COLOR UR: YELLOW
CREAT SERPL-MCNC: 2.48 MG/DL (ref 0.8–1.5)
CRYSTALS URNS QL MICRO: 0 /LPF
EPI CELLS #/AREA URNS HPF: NORMAL /HPF
ERYTHROCYTE [DISTWIDTH] IN BLOOD BY AUTOMATED COUNT: 13.9 % (ref 11.9–14.6)
GLOBULIN SER CALC-MCNC: 3 G/DL (ref 2.8–4.5)
GLUCOSE SERPL-MCNC: 194 MG/DL (ref 65–100)
GLUCOSE UR STRIP.AUTO-MCNC: 100 MG/DL
HCT VFR BLD AUTO: 39.3 % (ref 41.1–50.3)
HGB BLD-MCNC: 13.1 G/DL (ref 13.6–17.2)
HGB UR QL STRIP: ABNORMAL
KETONES UR QL STRIP.AUTO: NEGATIVE MG/DL
LEUKOCYTE ESTERASE UR QL STRIP.AUTO: ABNORMAL
MCH RBC QN AUTO: 29 PG (ref 26.1–32.9)
MCHC RBC AUTO-ENTMCNC: 33.3 G/DL (ref 31.4–35)
MCV RBC AUTO: 87.1 FL (ref 82–102)
MUCOUS THREADS URNS QL MICRO: NORMAL /LPF
NITRITE UR QL STRIP.AUTO: NEGATIVE
NRBC # BLD: 0 K/UL (ref 0–0.2)
OTHER OBSERVATIONS: NORMAL
PH UR STRIP: 5.5 [PH] (ref 5–9)
PLATELET # BLD AUTO: 215 K/UL (ref 150–450)
PMV BLD AUTO: 10.5 FL (ref 9.4–12.3)
POTASSIUM SERPL-SCNC: 4.6 MMOL/L (ref 3.5–5.1)
PROT SERPL-MCNC: 7.4 G/DL (ref 6.4–8.2)
PROT UR STRIP-MCNC: 100 MG/DL
RBC # BLD AUTO: 4.51 M/UL (ref 4.23–5.6)
RBC #/AREA URNS HPF: NORMAL /HPF
SERVICE CMNT-IMP: ABNORMAL
SODIUM SERPL-SCNC: 138 MMOL/L (ref 133–143)
SP GR UR REFRACTOMETRY: 1.02 (ref 1–1.02)
UROBILINOGEN UR QL STRIP.AUTO: 0.2 EU/DL (ref 0.2–1)
WBC # BLD AUTO: 10.1 K/UL (ref 4.3–11.1)
WBC URNS QL MICRO: NORMAL /HPF

## 2022-12-03 PROCEDURE — 85027 COMPLETE CBC AUTOMATED: CPT

## 2022-12-03 PROCEDURE — 6370000000 HC RX 637 (ALT 250 FOR IP): Performed by: EMERGENCY MEDICINE

## 2022-12-03 PROCEDURE — 99283 EMERGENCY DEPT VISIT LOW MDM: CPT

## 2022-12-03 PROCEDURE — 80053 COMPREHEN METABOLIC PANEL: CPT

## 2022-12-03 PROCEDURE — 51702 INSERT TEMP BLADDER CATH: CPT

## 2022-12-03 PROCEDURE — 81001 URINALYSIS AUTO W/SCOPE: CPT

## 2022-12-03 RX ORDER — LIDOCAINE HYDROCHLORIDE 20 MG/ML
JELLY TOPICAL
Status: COMPLETED | OUTPATIENT
Start: 2022-12-03 | End: 2022-12-03

## 2022-12-03 RX ORDER — CEFDINIR 300 MG/1
300 CAPSULE ORAL DAILY
Qty: 10 CAPSULE | Refills: 0 | Status: SHIPPED | OUTPATIENT
Start: 2022-12-03 | End: 2022-12-13

## 2022-12-03 RX ORDER — TAMSULOSIN HYDROCHLORIDE 0.4 MG/1
0.4 CAPSULE ORAL DAILY
Qty: 30 CAPSULE | Refills: 0 | Status: SHIPPED | OUTPATIENT
Start: 2022-12-03

## 2022-12-03 RX ADMIN — LIDOCAINE HYDROCHLORIDE: 20 JELLY TOPICAL at 15:29

## 2022-12-03 ASSESSMENT — ENCOUNTER SYMPTOMS
NAUSEA: 0
ABDOMINAL PAIN: 1
BACK PAIN: 0
ABDOMINAL DISTENTION: 1
COLOR CHANGE: 0

## 2022-12-03 ASSESSMENT — PAIN DESCRIPTION - LOCATION: LOCATION: PELVIS

## 2022-12-03 ASSESSMENT — PAIN SCALES - GENERAL: PAINLEVEL_OUTOF10: 8

## 2022-12-03 ASSESSMENT — PAIN - FUNCTIONAL ASSESSMENT
PAIN_FUNCTIONAL_ASSESSMENT: 0-10
PAIN_FUNCTIONAL_ASSESSMENT: NONE - DENIES PAIN

## 2022-12-03 NOTE — ED TRIAGE NOTES
Pt states he had cystoscopy/ lithotripsy done yesterday and since then he has been unable to empty bladder but has been urinating small amounts frequently with pelvic pain on both sides. Had stent put in yesterday as well. Pain and burning when he attempts to urinate.

## 2022-12-03 NOTE — ED PROVIDER NOTES
Emergency Department Provider Note                   PCP:                Emiliano Arriaga MD               Age: 80 y.o. Sex: male       ICD-10-CM    1. Acute urinary retention  R33.8           DISPOSITION Decision To Discharge 12/03/2022 04:09:44 PM        MDM  Number of Diagnoses or Management Options  Acute urinary retention  Diagnosis management comments: Patient needs Wisdom catheter placement for acute urinary retention. He has a stent in place and a string coming out of the end of his penis. Discussed the case with Dr. Sherri Duke with urology and we should hold the string when placing the Wisdom, however if it gets pushed and he says not to worry about at the stent can be removed regardless. We will check basic labs and make sure there is no postobstructive renal failure. Amount and/or Complexity of Data Reviewed  Clinical lab tests: ordered and reviewed  Discuss the patient with other providers: yes (Discussed with urology Dr. Ji Fisher)    Risk of Complications, Morbidity, and/or Mortality  Presenting problems: moderate  Diagnostic procedures: low  Management options: low    Patient Progress  Patient progress: improved             Orders Placed This Encounter   Procedures    Urinalysis w rflx microscopic    CBC    Comprehensive Metabolic Panel    Urinalysis, Micro    BLADDER CHECKS    Insert wisdom catheter    Insert peripheral IV        Medications   lidocaine (XYLOCAINE) 2 % uro-jet ( Topical Given 12/3/22 1529)       New Prescriptions    CEFDINIR (OMNICEF) 300 MG CAPSULE    Take 1 capsule by mouth daily for 10 days    TAMSULOSIN (FLOMAX) 0.4 MG CAPSULE    Take 1 capsule by mouth daily        Jacquelynn Duverney is a 80 y.o. male who presents to the Emergency Department with chief complaint of    Chief Complaint   Patient presents with    Urinary Retention    Pelvic Pain      49-year-old male presents with difficulty urinary and urinary retention since yesterday.   Yesterday he had lithotripsy and stent replacement for a 5 mm left proximal ureter stone. He is on Cipro for infection he had prior to that. He has a history of recurrent UTIs and benign prostate hypertrophy. Patient states he may need a TURP in the future. Patient recently started on Flomax. Patient unsure if he is on blood thinners. No fevers or chills and he does have sharp suprapubic abdominal pain and distention. Pain worse with movement and palpation. The history is provided by the patient and the spouse. Review of Systems   Constitutional:  Negative for chills and fever. Gastrointestinal:  Positive for abdominal distention and abdominal pain. Negative for nausea. Endocrine: Negative for polydipsia and polyuria. Genitourinary:  Positive for difficulty urinating and dysuria. Negative for hematuria. Musculoskeletal:  Negative for back pain and neck pain. Skin:  Negative for color change and rash. All other systems reviewed and are negative. Past Medical History:   Diagnosis Date    BPH (benign prostatic hyperplasia)     Chronic bacterial prostatitis     Chronic kidney disease (CKD), stage III (moderate) (HCC)     Deviated nasal septum     Hearing loss sensory, bilateral     Hypertension     Hypertrophy of nasal turbinates     SARAVANAN (obstructive sleep apnea)     Subdural hematoma 2019    Type 2 diabetes mellitus ([facility] [address]     no medicaitons    Ureteral stone     Vestibular disequilibrium involving both inner ears         Past Surgical History:   Procedure Laterality Date    CATARACT EXTRACTION  2016    COLONOSCOPY      CYSTOSCOPY Left 12/2/2022    CYSTOSCOPY,LEFT URETEROSCOPY, LASER LITHOTRIPSY, LEFT URETERAL STENT EXCHANGE performed by Keiry Brock MD at [facility]  10/29/2022    [address]        History reviewed. No pertinent family history.      Social History     Socioeconomic History    Marital status:      Spouse name: None    Number of children: None    Years of education: None Highest education level: None   Tobacco Use    Smoking status: Former     Types: Pipe     Quit date:      Years since quittin.9    Smokeless tobacco: Never   Vaping Use    Vaping Use: Never used   Substance and Sexual Activity    Alcohol use: Yes     Comment: occasional    Drug use: Not Currently         Patient has no known allergies. Previous Medications    AMLODIPINE (NORVASC) 10 MG TABLET    Take by mouth daily    CIPROFLOXACIN (CIPRO) 500 MG TABLET    Take 1 tablet by mouth 2 times daily for 7 days    FINASTERIDE (PROSCAR) 5 MG TABLET    Take 1 tablet by mouth daily    LOSARTAN (COZAAR) 50 MG TABLET    TAKE 1 TABLET BY MOUTH EVERY DAY        Vitals signs and nursing note reviewed. Patient Vitals for the past 4 hrs:   Temp Pulse Resp BP SpO2   22 1506 98.3 °F (36.8 °C) 90 18 (!) 153/98 96 %          Physical Exam  Vitals and nursing note reviewed. Constitutional:       General: He is not in acute distress. Appearance: Normal appearance. He is not ill-appearing or diaphoretic. HENT:      Head: Normocephalic and atraumatic. Nose: Nose normal.      Mouth/Throat:      Mouth: Mucous membranes are moist.   Eyes:      Conjunctiva/sclera: Conjunctivae normal.      Pupils: Pupils are equal, round, and reactive to light. Cardiovascular:      Rate and Rhythm: Normal rate and regular rhythm. Pulses: Normal pulses. Heart sounds: Normal heart sounds. Pulmonary:      Effort: Pulmonary effort is normal.      Breath sounds: Normal breath sounds. Abdominal:      General: There is distension (Suprapubic distention and tenderness present). Palpations: Abdomen is soft. Tenderness: There is abdominal tenderness. There is no guarding or rebound. Musculoskeletal:         General: Normal range of motion. Right lower leg: No edema. Left lower leg: No edema. Skin:     General: Skin is warm and dry.    Neurological:      Mental Status: He is alert and oriented to person, place, and time.    Psychiatric:         Behavior: Behavior normal.        Procedures    Results for orders placed or performed during the hospital encounter of 12/03/22   Urinalysis w rflx microscopic   Result Value Ref Range    Color, UA YELLOW      Appearance CLOUDY      Specific Gravity, UA 1.025 (H) 1.001 - 1.023      pH, Urine 5.5 5.0 - 9.0      Protein,  (A) NEG mg/dL    Glucose,  mg/dL    Ketones, Urine Negative NEG mg/dL    Bilirubin Urine Negative NEG      Blood, Urine LARGE (A) NEG      Urobilinogen, Urine 0.2 0.2 - 1.0 EU/dL    Nitrite, Urine Negative NEG      Leukocyte Esterase, Urine SMALL (A) NEG     CBC   Result Value Ref Range    WBC 10.1 4.3 - 11.1 K/uL    RBC 4.51 4.23 - 5.60 M/uL    Hemoglobin 13.1 (L) 13.6 - 17.2 g/dL    Hematocrit 39.3 (L) 41.1 - 50.3 %    MCV 87.1 82.0 - 102.0 FL    MCH 29.0 26.1 - 32.9 PG    MCHC 33.3 31.4 - 35.0 g/dL    RDW 13.9 11.9 - 14.6 %    Platelets 493 732 - 596 K/uL    MPV 10.5 9.4 - 12.3 FL    nRBC 0.00 0.0 - 0.2 K/uL   Comprehensive Metabolic Panel   Result Value Ref Range    Sodium 138 133 - 143 mmol/L    Potassium 4.6 3.5 - 5.1 mmol/L    Chloride 104 98 - 107 mmol/L    CO2 19 (L) 21 - 32 mmol/L    Anion Gap 15 (H) 2 - 11 mmol/L    Glucose 194 (H) 65 - 100 mg/dL    BUN 44 (H) 7.0 - 18.0 MG/DL    Creatinine 2.48 (H) 0.8 - 1.5 MG/DL    Est, Glom Filt Rate 24 (L) >60 ml/min/1.73m2    Calcium 9.2 8.3 - 10.4 MG/DL    Total Bilirubin 0.5 0.2 - 1.1 MG/DL    ALT 12 (L) 13.0 - 61.0 U/L    AST 19 15 - 37 U/L    Alk Phosphatase 88 45.0 - 117.0 U/L    Total Protein 7.4 6.4 - 8.2 g/dL    Albumin 4.4 3.2 - 4.6 g/dL    Globulin 3.0 2.8 - 4.5 g/dL    Albumin/Globulin Ratio 1.5 0.4 - 1.6     Urinalysis, Micro   Result Value Ref Range    WBC, UA 3-5 0 /hpf    RBC, UA  0 /hpf    Epithelial Cells UA 0-3 0 /hpf    BACTERIA, URINE TRACE 0 /hpf    Casts 0 0 /lpf    Crystals 0 0 /LPF    Mucus, UA TRACE 0 /lpf    Other observations RESULTS VERIFIED MANUALLY No orders to display                       Voice dictation software was used during the making of this note. This software is not perfect and grammatical and other typographical errors may be present. This note has not been completely proofread for errors.      Martínez Bingham MD  12/03/22 Erik Morrell MD  12/03/22 [id number]

## 2022-12-03 NOTE — ED NOTES
I have reviewed discharge instructions with the patient. The patient verbalized understanding. Patient left ED via Discharge Method: ambulatory to Home with wife. Opportunity for questions and clarification provided. Patient given 2 scripts. To continue your aftercare when you leave the hospital, you may receive an automated call from our care team to check in on how you are doing. This is a free service and part of our promise to provide the best care and service to meet your aftercare needs.  If you have questions, or wish to unsubscribe from this service please call [phone]. Thank you for Choosing our [facility] Emergency Department.       Dennis Mchugh RN  12/03/22 3952

## 2022-12-03 NOTE — DISCHARGE INSTRUCTIONS
Stop Cipro and take Omnicef instead. Continue current antibiotic. Restart Flomax and take once daily until told to stop by urology.   Call Dr. Aníbal Marin Monday for follow-up appointment later this week for Morse catheter removal.

## 2022-12-04 RX ORDER — NITROFURANTOIN 25; 75 MG/1; MG/1
100 CAPSULE ORAL 2 TIMES DAILY
Qty: 14 CAPSULE | Refills: 0 | Status: SHIPPED | OUTPATIENT
Start: 2022-12-04 | End: 2022-12-11

## 2022-12-07 ENCOUNTER — OFFICE VISIT (OUTPATIENT)
Dept: UROLOGY | Age: 87
End: 2022-12-07
Payer: MEDICARE

## 2022-12-07 DIAGNOSIS — N20.1 LEFT URETERAL STONE: Primary | ICD-10-CM

## 2022-12-07 DIAGNOSIS — N13.8 BPH WITH OBSTRUCTION/LOWER URINARY TRACT SYMPTOMS: ICD-10-CM

## 2022-12-07 DIAGNOSIS — N39.0 RECURRENT UTI: ICD-10-CM

## 2022-12-07 DIAGNOSIS — N40.1 BPH WITH OBSTRUCTION/LOWER URINARY TRACT SYMPTOMS: ICD-10-CM

## 2022-12-07 DIAGNOSIS — R33.9 URINARY RETENTION: ICD-10-CM

## 2022-12-07 PROCEDURE — G8427 DOCREV CUR MEDS BY ELIG CLIN: HCPCS | Performed by: NURSE PRACTITIONER

## 2022-12-07 PROCEDURE — 99215 OFFICE O/P EST HI 40 MIN: CPT | Performed by: NURSE PRACTITIONER

## 2022-12-07 PROCEDURE — 1123F ACP DISCUSS/DSCN MKR DOCD: CPT | Performed by: NURSE PRACTITIONER

## 2022-12-07 PROCEDURE — G8484 FLU IMMUNIZE NO ADMIN: HCPCS | Performed by: NURSE PRACTITIONER

## 2022-12-07 PROCEDURE — G8417 CALC BMI ABV UP PARAM F/U: HCPCS | Performed by: NURSE PRACTITIONER

## 2022-12-07 PROCEDURE — 1036F TOBACCO NON-USER: CPT | Performed by: NURSE PRACTITIONER

## 2022-12-07 RX ORDER — NITROFURANTOIN 25; 75 MG/1; MG/1
100 CAPSULE ORAL 2 TIMES DAILY
Qty: 14 CAPSULE | Refills: 0 | OUTPATIENT
Start: 2022-12-07 | End: 2022-12-14

## 2022-12-07 RX ORDER — CIPROFLOXACIN 500 MG/1
500 TABLET, FILM COATED ORAL 2 TIMES DAILY
Qty: 14 TABLET | Refills: 0 | OUTPATIENT
Start: 2022-12-07 | End: 2022-12-14

## 2022-12-07 NOTE — PROGRESS NOTES
[facility]  [address] [facility]    [address] [facility], [address] [facility]  [address]  : 1935    Chief Complaint   Patient presents with    Urinary Retention    Nephrolithiasis     Cath/stent           HPI     Parker Henson is a 80 y.o. male with h/o bph and recurrent uti's who presented to [facility] 10/28/22 and was found to have UTI and 5 m LEFT proximal ureteral stone. LEFT ureteral stent was placed urgently on 10/29/22. He is s/p L URS w stent exchange on 22. The patient actually had two stones lying directly next to each other dependently in the lower pole of the left kidney and I was able to fragment these to until all stone fragments were less than 0.5 mm in size. Of note, His prostate was approximately 2.5 to 3 cm in length with trilobar enlargement that would be a better candidate for a TURP than a UroLift in the future if needed. He presented to ER on 12/3/22 w c/o dysuria. Found to be in retention-catheter was placed. He was given script for presumed UTI. Here today for fu. He has no complaints. Afebrile. Catheter draining wo issue. Compliant w flomax and finasteride. KUB in office today reviewed by myself and shows no stone; left ureteral stent in good position.                Past Medical History:   Diagnosis Date    BPH (benign prostatic hyperplasia)     Chronic bacterial prostatitis     Chronic kidney disease (CKD), stage III (moderate) (HCC)     Deviated nasal septum     Hearing loss sensory, bilateral     Hypertension     Hypertrophy of nasal turbinates     SARAVANAN (obstructive sleep apnea)     Subdural hematoma     Type 2 diabetes mellitus ([address]     no medicaitons    Ureteral stone     Vestibular disequilibrium involving both inner ears      Past Surgical History:   Procedure Laterality Date    CATARACT EXTRACTION  2016    COLONOSCOPY      CYSTOSCOPY Left 2022    CYSTOSCOPY,LEFT URETEROSCOPY, LASER LITHOTRIPSY, LEFT URETERAL STENT EXCHANGE performed by Meghna Zaman MD at [facility] MAIN OR    CYSTOURETHROSCOPY  10/29/2022    ELBOW SURGERY       Current Outpatient Medications   Medication Sig Dispense Refill    finasteride (PROSCAR) 5 MG tablet Take 1 tablet by mouth daily 90 tablet 3    amLODIPine (NORVASC) 10 MG tablet Take by mouth daily      losartan (COZAAR) 50 MG tablet TAKE 1 TABLET BY MOUTH EVERY DAY      nitrofurantoin, macrocrystal-monohydrate, (MACROBID) 100 MG capsule Take 1 capsule by mouth 2 times daily for 7 days (Patient not taking: Reported on 2022) 14 capsule 0    tamsulosin (FLOMAX) 0.4 MG capsule Take 1 capsule by mouth daily (Patient not taking: Reported on 2022) 30 capsule 0    cefdinir (OMNICEF) 300 MG capsule Take 1 capsule by mouth daily for 10 days (Patient not taking: Reported on 2022) 10 capsule 0    ciprofloxacin (CIPRO) 500 MG tablet Take 1 tablet by mouth 2 times daily for 7 days (Patient not taking: Reported on 2022) 14 tablet 0     No current facility-administered medications for this visit.      No Known Allergies  Social History     Socioeconomic History    Marital status:      Spouse name: Not on file    Number of children: Not on file    Years of education: Not on file    Highest education level: Not on file   Occupational History    Not on file   Tobacco Use    Smoking status: Former     Types: Pipe     Quit date: 80     Years since quittin.9    Smokeless tobacco: Never   Vaping Use    Vaping Use: Never used   Substance and Sexual Activity    Alcohol use: Yes     Comment: occasional    Drug use: Not Currently    Sexual activity: Not on file   Other Topics Concern    Not on file   Social History Narrative    Not on file     Social Determinants of Health     Financial Resource Strain: Not on file   Food Insecurity: Not on file   Transportation Needs: Not on file   Physical Activity: Not on file   Stress: Not on file   Social Connections: Not on file   Intimate Partner Violence: Not on file   Housing Stability: Not on file     No family history on file. Review of Systems  Constitutional: Negative  GI: Neg GI ROS  Genitourinary: Genitourinary negative    Urinalysis    PHYSICAL EXAM    General appearance - well appearing and in no distress  Mental status - alert, oriented to person, place, and time  Neck - supple, no significant adenopathy  Chest/Lung-  Quiet, even and easy respiratory effort without use of accessory muscles  Skin - normal coloration and turgor, no rashes  Indwelling urinary catheter  Assessment and Plan    ICD-10-CM    1. Left ureteral stone  N20.1 AMB POC XRAY ABDOMEN 1 VIEW     US RETROPERITONEAL COMPLETE      2. Urinary retention  R33.9       3. BPH with obstruction/lower urinary tract symptoms  N40.1     N13.8       4. Recurrent UTI  N39.0 AMB POC XRAY ABDOMEN 1 VIEW     US RETROPERITONEAL COMPLETE        VT today. Patient placed in supine position. Morse catheter balloon deflated. Catheter removed intact and w/o difficulty. Patient tolerated well. Push fluids. He is instructed to return to office today by 4 pm if unable to void. Patient placed in supine position. Stent removed intact and without difficulty. Patient tolerated well. Explained to pt he may notice some blood in urine for the next several days. Bleeding should clear with increased water intake and time. Can use heat and/or tylenol for lower back or abd discomfort. KENIA in 4 weeks. Will call results. Continue flomax and finasteride. Stone prevention discussed. He will ROV next week w Dr. Jesus Duffy for PVR. To call sooner if needed. Joanna Duffy is supervising physician today and he approves plan of care. I have spent 60 minutes today reviewing previous notes, test results and face to face with the patient as well as documenting.

## 2022-12-14 ENCOUNTER — OFFICE VISIT (OUTPATIENT)
Dept: UROLOGY | Age: 87
End: 2022-12-14
Payer: MEDICARE

## 2022-12-14 DIAGNOSIS — N13.8 BPH WITH OBSTRUCTION/LOWER URINARY TRACT SYMPTOMS: ICD-10-CM

## 2022-12-14 DIAGNOSIS — N20.1 LEFT URETERAL STONE: Primary | ICD-10-CM

## 2022-12-14 DIAGNOSIS — N39.0 RECURRENT UTI: ICD-10-CM

## 2022-12-14 DIAGNOSIS — N40.1 BPH WITH OBSTRUCTION/LOWER URINARY TRACT SYMPTOMS: ICD-10-CM

## 2022-12-14 PROCEDURE — G8427 DOCREV CUR MEDS BY ELIG CLIN: HCPCS | Performed by: UROLOGY

## 2022-12-14 PROCEDURE — G8484 FLU IMMUNIZE NO ADMIN: HCPCS | Performed by: UROLOGY

## 2022-12-14 PROCEDURE — G8417 CALC BMI ABV UP PARAM F/U: HCPCS | Performed by: UROLOGY

## 2022-12-14 PROCEDURE — 1123F ACP DISCUSS/DSCN MKR DOCD: CPT | Performed by: UROLOGY

## 2022-12-14 PROCEDURE — 1036F TOBACCO NON-USER: CPT | Performed by: UROLOGY

## 2022-12-14 PROCEDURE — 99214 OFFICE O/P EST MOD 30 MIN: CPT | Performed by: UROLOGY

## 2022-12-14 ASSESSMENT — ENCOUNTER SYMPTOMS: NAUSEA: 0

## 2022-12-14 NOTE — PROGRESS NOTES
[facility]  [address] [place] [address]    [address] [place] [address], [address] [facility]  [address] [facility]  : 1935    Chief Complaint   Patient presents with    Follow-up          HPI     Burgess Baxter is a 80 y.o. male with h/o bph and recurrent uti's who presented to [facility] 10/28/22 and was found to have UTI and 5 m LEFT proximal ureteral stone. LEFT ureteral stent was placed urgency 10/29/22 and he is currently scheduled for cystoscopy, LEFT ureteroscopy, laser lithotripsy, LEFT ureteral stent exchange with Dr. Eber Billy on 22. He has had a urolift in the past and has recurrent uti's. Cystoscopy at time of cystoscopy, LEFT ureteroscopy, laser lithotripsy, LEFT ureteral stent exchange 22 revealed a prostate that would be amenable to TURP. Voiding is at baseline.     PVR: 22 407 ml          Past Medical History:   Diagnosis Date    BPH (benign prostatic hyperplasia)     Chronic bacterial prostatitis     Chronic kidney disease (CKD), stage III (moderate) (HCC)     Deviated nasal septum     Hearing loss sensory, bilateral     Hypertension     Hypertrophy of nasal turbinates     SARAVANAN (obstructive sleep apnea)     Subdural hematoma     Type 2 diabetes mellitus ([facility] [address]     no medicaitons    Ureteral stone     Vestibular disequilibrium involving both inner ears      Past Surgical History:   Procedure Laterality Date    CATARACT EXTRACTION  2016    COLONOSCOPY      CYSTOSCOPY Left 2022    CYSTOSCOPY,LEFT URETEROSCOPY, LASER LITHOTRIPSY, LEFT URETERAL STENT EXCHANGE performed by Ibrahima Reddy MD at [address]  10/29/2022    ELBOW SURGERY       Current Outpatient Medications   Medication Sig Dispense Refill    tamsulosin (FLOMAX) 0.4 MG capsule Take 1 capsule by mouth daily 30 capsule 0    finasteride (PROSCAR) 5 MG tablet Take 1 tablet by mouth daily 90 tablet 3    amLODIPine (NORVASC) 10 MG tablet Take by mouth daily      losartan (COZAAR) 50 MG tablet TAKE 1 TABLET BY MOUTH EVERY DAY       No current facility-administered medications for this visit. No Known Allergies  Social History     Socioeconomic History    Marital status:      Spouse name: Not on file    Number of children: Not on file    Years of education: Not on file    Highest education level: Not on file   Occupational History    Not on file   Tobacco Use    Smoking status: Former     Types: Pipe     Quit date: 80     Years since quittin.9    Smokeless tobacco: Never   Vaping Use    Vaping Use: Never used   Substance and Sexual Activity    Alcohol use: Yes     Comment: occasional    Drug use: Not Currently    Sexual activity: Not on file   Other Topics Concern    Not on file   Social History Narrative    Not on file     Social Determinants of Health     Financial Resource Strain: Not on file   Food Insecurity: Not on file   Transportation Needs: Not on file   Physical Activity: Not on file   Stress: Not on file   Social Connections: Not on file   Intimate Partner Violence: Not on file   Housing Stability: Not on file     History reviewed. No pertinent family history. Review of Systems  Constitutional:   Negative for fever. GI:  Negative for nausea. Genitourinary:  Negative for flank pain.     Urinalysis  UA - Dipstick  Results for orders placed or performed in visit on 22   AMB POC URINALYSIS DIP STICK AUTO W/O MICRO   Result Value Ref Range    Color (UA POC)      Clarity (UA POC)      Glucose, Urine,  Negative    Bilirubin, Urine, POC Negative Negative    KETONES, Urine, POC Negative Negative    Specific Gravity, Urine, POC 1.015 1.001 - 1.035    Blood (UA POC) Small Negative    pH, Urine, POC 5.5 4.6 - 8.0    Protein, Urine, POC 30 Negative    Urobilinogen, POC Normal     Nitrite, Urine, POC Negative Negative    Leukocyte Esterase, Urine, POC Small Negative   Results for orders placed or performed in visit on 22   AMB POC URINALYSIS DIP STICK AUTO W/O MICRO   Result Value Ref Range    Color, Urine, POC      Clarity, Urine, POC      Glucose, Urine,  Negative    Bilirubin, Urine, POC Negative Negative    Ketones, Urine, POC Negative Negative    Specific Gravity, Urine, POC 1.020 1.001 - 1.035    Blood, Urine, POC Moderate Negative    pH, Urine, POC 7.0 4.6 - 8.0    Protein, Urine,  Negative    Urobilinogen, POC 0.2     Nitrate, Urine, POC Negative Negative    Leukocyte Esterase, Urine, POC Trace Negative       There were no vitals taken for this visit. GENERAL: NAD, ALERT, A&O x 3, GAIT NORMAL  LUNGS: easy work of breathing  ABDOMEN: soft, non tender  NEUROLOGIC: cranial nerves 2-12 grossly intact     KUB: NO stones visible        Assessment and Plan    ICD-10-CM    1. Left ureteral stone  N20.1 AMB POC XRAY ABDOMEN 1 VIEW     AMB POC PVR, LUISA,POST-VOID RES,US,NON-IMAGING      2. Recurrent UTI  N39.0       3. BPH with obstruction/lower urinary tract symptoms  N40.1     N13.8           PVR is high. He only started fiansteride in 11/22. He will continue finasteride/tamsulosin and return with pvr in 5/23. IF UTI's recur sooner, he could pursue TURP.

## 2023-01-03 ENCOUNTER — HOSPITAL ENCOUNTER (OUTPATIENT)
Dept: ULTRASOUND IMAGING | Age: 88
Discharge: HOME | End: 2023-01-06
Payer: MEDICARE

## 2023-01-03 ENCOUNTER — HOSPITAL ENCOUNTER (OUTPATIENT)
Dept: ULTRASOUND IMAGING | Age: 88
Discharge: HOME | End: 2023-01-05

## 2023-01-03 DIAGNOSIS — N39.0 RECURRENT UTI: ICD-10-CM

## 2023-01-03 DIAGNOSIS — N20.1 LEFT URETERAL STONE: ICD-10-CM

## 2023-01-03 PROCEDURE — 76770 US EXAM ABDO BACK WALL COMP: CPT

## 2023-02-06 ENCOUNTER — OFFICE VISIT (OUTPATIENT)
Dept: UROLOGY | Age: 88
End: 2023-02-06
Payer: MEDICARE

## 2023-02-06 DIAGNOSIS — N13.8 BPH WITH OBSTRUCTION/LOWER URINARY TRACT SYMPTOMS: Primary | ICD-10-CM

## 2023-02-06 DIAGNOSIS — N39.0 RECURRENT UTI: ICD-10-CM

## 2023-02-06 DIAGNOSIS — N40.1 BPH WITH OBSTRUCTION/LOWER URINARY TRACT SYMPTOMS: Primary | ICD-10-CM

## 2023-02-06 DIAGNOSIS — N20.0 KIDNEY STONE: ICD-10-CM

## 2023-02-06 LAB
BILIRUBIN, URINE, POC: NEGATIVE
BLOOD URINE, POC: NEGATIVE
GLUCOSE URINE, POC: 100
KETONES, URINE, POC: NEGATIVE
LEUKOCYTE ESTERASE, URINE, POC: NEGATIVE
NITRITE, URINE, POC: NEGATIVE
PH, URINE, POC: 5.5 (ref 4.6–8)
PROTEIN,URINE, POC: 30
PVR, POC: ABNORMAL CC
SPECIFIC GRAVITY, URINE, POC: 1.01 (ref 1–1.03)
URINALYSIS CLARITY, POC: ABNORMAL
URINALYSIS COLOR, POC: ABNORMAL
UROBILINOGEN, POC: ABNORMAL

## 2023-02-06 PROCEDURE — G8427 DOCREV CUR MEDS BY ELIG CLIN: HCPCS | Performed by: NURSE PRACTITIONER

## 2023-02-06 PROCEDURE — G8417 CALC BMI ABV UP PARAM F/U: HCPCS | Performed by: NURSE PRACTITIONER

## 2023-02-06 PROCEDURE — 51798 US URINE CAPACITY MEASURE: CPT | Performed by: NURSE PRACTITIONER

## 2023-02-06 PROCEDURE — 81003 URINALYSIS AUTO W/O SCOPE: CPT | Performed by: NURSE PRACTITIONER

## 2023-02-06 PROCEDURE — 1123F ACP DISCUSS/DSCN MKR DOCD: CPT | Performed by: NURSE PRACTITIONER

## 2023-02-06 PROCEDURE — G8484 FLU IMMUNIZE NO ADMIN: HCPCS | Performed by: NURSE PRACTITIONER

## 2023-02-06 PROCEDURE — 99214 OFFICE O/P EST MOD 30 MIN: CPT | Performed by: NURSE PRACTITIONER

## 2023-02-06 PROCEDURE — 1036F TOBACCO NON-USER: CPT | Performed by: NURSE PRACTITIONER

## 2023-02-06 RX ORDER — TAMSULOSIN HYDROCHLORIDE 0.4 MG/1
0.4 CAPSULE ORAL DAILY
Qty: 90 CAPSULE | Refills: 3 | Status: SHIPPED | OUTPATIENT
Start: 2023-02-06

## 2023-02-06 ASSESSMENT — ENCOUNTER SYMPTOMS
RESPIRATORY NEGATIVE: 1
EYES NEGATIVE: 1

## 2023-02-06 NOTE — PROGRESS NOTES
[facility]  [address] [facility]    [address] [place] [address], [address] [facility]  [address]  : 1935    Chief Complaint   Patient presents with    Other     Frequency and weak stream           HPI     Scarlet Espino is a 80 y.o. male with h/o bph and recurrent uti's who presented to [facility] 10/28/22 and was found to have UTI and 5 m LEFT proximal ureteral stone. LEFT ureteral stent was placed urgently on 10/29/22. He is s/p L URS w stent exchange on 22. The patient actually had two stones lying directly next to each other dependently in the lower pole of the left kidney that were fragmented until all stone fragments were less than 0.5 mm in size. Of note, His prostate was approximately 2.5 to 3 cm in length with trilobar enlargement that would be a better candidate for a TURP than a UroLift in the future if needed. He presented to ER on 12/3/22 w c/o dysuria. Found to be in retention-catheter was placed. He was given script for presumed UTI.      KUB in office 22 reviewed by myself and shows no stone; left ureteral stent in good position. Stent and catheter were removed. Seen by Dr. Ana Alicia for fu.  cc. Reports recent weak/slow stream w UF and nocturia. Has recently had a resp virus. LUTS were minimal prior to this. Continues w flomax and finasteride. UA clear.  cc today.            Past Medical History:   Diagnosis Date    BPH (benign prostatic hyperplasia)     Chronic bacterial prostatitis     Chronic kidney disease (CKD), stage III (moderate) (HCC)     Deviated nasal septum     Hearing loss sensory, bilateral     Hypertension     Hypertrophy of nasal turbinates     SARAVANAN (obstructive sleep apnea)     Subdural hematoma     Type 2 diabetes mellitus ([address]     no medicaitons    Ureteral stone     Vestibular disequilibrium involving both inner ears      Past Surgical History:   Procedure Laterality Date    CATARACT EXTRACTION   COLONOSCOPY      CYSTOSCOPY Left 2022    CYSTOSCOPY,LEFT URETEROSCOPY, LASER LITHOTRIPSY, LEFT URETERAL STENT EXCHANGE performed by Kimber Brittle, MD at [facility] MAIN OR    CYSTOURETHROSCOPY  10/29/2022    ELBOW SURGERY       Current Outpatient Medications   Medication Sig Dispense Refill    tamsulosin (FLOMAX) 0.4 MG capsule Take 1 capsule by mouth daily 90 capsule 3    finasteride (PROSCAR) 5 MG tablet Take 1 tablet by mouth daily 90 tablet 3    amLODIPine (NORVASC) 10 MG tablet Take by mouth daily      losartan (COZAAR) 50 MG tablet TAKE 1 TABLET BY MOUTH EVERY DAY       No current facility-administered medications for this visit. No Known Allergies  Social History     Socioeconomic History    Marital status:      Spouse name: Not on file    Number of children: Not on file    Years of education: Not on file    Highest education level: Not on file   Occupational History    Not on file   Tobacco Use    Smoking status: Former     Types: Pipe     Quit date: 80     Years since quittin.1    Smokeless tobacco: Never   Vaping Use    Vaping Use: Never used   Substance and Sexual Activity    Alcohol use: Yes     Comment: occasional    Drug use: Not Currently    Sexual activity: Not on file   Other Topics Concern    Not on file   Social History Narrative    Not on file     Social Determinants of Health     Financial Resource Strain: Not on file   Food Insecurity: Not on file   Transportation Needs: Not on file   Physical Activity: Not on file   Stress: Not on file   Social Connections: Not on file   Intimate Partner Violence: Not on file   Housing Stability: Not on file     History reviewed. No pertinent family history.     Review of Systems  Constitutional: Negative  Skin: Negative skin ROS  Eyes: Eyes negative  ENT: HENT negative  Respiratory: Respiratory negative  Cardiovascular: Neg cardio ROS  GI: Neg GI ROS  Genitourinary: Positive for nocturia, slower stream and frequent urination. Musculoskeletal: Musculoskeletal negative  Psychological: Neg psych ROS  Endocrine: Endocrine negative  Hem/Lymphatic: Hematologic/lymphatic negative    Urinalysis  UA - Dipstick  Results for orders placed or performed in visit on 02/06/23   AMB POC URINALYSIS DIP STICK AUTO W/O MICRO   Result Value Ref Range    Color (UA POC)      Clarity (UA POC)      Glucose, Urine,  Negative    Bilirubin, Urine, POC Negative Negative    KETONES, Urine, POC Negative Negative    Specific Gravity, Urine, POC 1.015 1.001 - 1.035    Blood (UA POC) Negative Negative    pH, Urine, POC 5.5 4.6 - 8.0    Protein, Urine, POC 30 Negative    Urobilinogen, POC 2 mg/dL     Nitrite, Urine, POC Negative Negative    Leukocyte Esterase, Urine, POC Negative Negative   Results for orders placed or performed in visit on 11/14/22   AMB POC URINALYSIS DIP STICK AUTO W/O MICRO   Result Value Ref Range    Color, Urine, POC      Clarity, Urine, POC      Glucose, Urine,  Negative    Bilirubin, Urine, POC Negative Negative    Ketones, Urine, POC Negative Negative    Specific Gravity, Urine, POC 1.020 1.001 - 1.035    Blood, Urine, POC Moderate Negative    pH, Urine, POC 7.0 4.6 - 8.0    Protein, Urine,  Negative    Urobilinogen, POC 0.2     Nitrate, Urine, POC Negative Negative    Leukocyte Esterase, Urine, POC Trace Negative       PHYSICAL EXAM    General appearance - well appearing and in no distress  Mental status - alert, oriented to person, place, and time  Neck - supple, no significant adenopathy  Chest/Lung-  Quiet, even and easy respiratory effort without use of accessory muscles  Skin - normal coloration and turgor, no rashes        Assessment and Plan    ICD-10-CM    1. BPH with obstruction/lower urinary tract symptoms  N40.1 tamsulosin (FLOMAX) 0.4 MG capsule    N13.8       2.  Recurrent UTI  N39.0 AMB POC URINALYSIS DIP STICK AUTO W/O MICRO     AMB POC PVR, LUISA,POST-VOID RES,US,NON-IMAGING      3. Kidney stone N20.0       Worsened LUTS likely from recent illness. Advised sx should improve w time and continued use of flomax and finasteride. Refill for flomax sent. Avoid cold medications and bladder irritants. KUB in Dec 2022 clear. Repeat in Dec 2023. Stone prevention discussed. Keep scheduled fu w Dr. Krysta Rivera. To call sooner if needed. Abby Rivera is supervising physician today and he approves plan of care.

## 2023-05-22 ENCOUNTER — OFFICE VISIT (OUTPATIENT)
Dept: UROLOGY | Age: 88
End: 2023-05-22
Payer: MEDICARE

## 2023-05-22 DIAGNOSIS — N40.1 BPH WITH OBSTRUCTION/LOWER URINARY TRACT SYMPTOMS: Primary | ICD-10-CM

## 2023-05-22 DIAGNOSIS — R06.01 ORTHOPNEA: ICD-10-CM

## 2023-05-22 DIAGNOSIS — N13.8 BPH WITH OBSTRUCTION/LOWER URINARY TRACT SYMPTOMS: Primary | ICD-10-CM

## 2023-05-22 LAB
BILIRUBIN, URINE, POC: NEGATIVE
BLOOD URINE, POC: NEGATIVE
GLUCOSE URINE, POC: 100
KETONES, URINE, POC: NEGATIVE
LEUKOCYTE ESTERASE, URINE, POC: NEGATIVE
NITRITE, URINE, POC: NEGATIVE
PH, URINE, POC: 5 (ref 4.6–8)
PROTEIN,URINE, POC: 30
PVR, POC: >331 CC
SPECIFIC GRAVITY, URINE, POC: 1.02 (ref 1–1.03)
URINALYSIS CLARITY, POC: NORMAL
URINALYSIS COLOR, POC: NORMAL
UROBILINOGEN, POC: NORMAL

## 2023-05-22 PROCEDURE — 81003 URINALYSIS AUTO W/O SCOPE: CPT | Performed by: UROLOGY

## 2023-05-22 PROCEDURE — G8417 CALC BMI ABV UP PARAM F/U: HCPCS | Performed by: UROLOGY

## 2023-05-22 PROCEDURE — G8427 DOCREV CUR MEDS BY ELIG CLIN: HCPCS | Performed by: UROLOGY

## 2023-05-22 PROCEDURE — 51798 US URINE CAPACITY MEASURE: CPT | Performed by: UROLOGY

## 2023-05-22 PROCEDURE — 1123F ACP DISCUSS/DSCN MKR DOCD: CPT | Performed by: UROLOGY

## 2023-05-22 PROCEDURE — 99214 OFFICE O/P EST MOD 30 MIN: CPT | Performed by: UROLOGY

## 2023-05-22 PROCEDURE — 1036F TOBACCO NON-USER: CPT | Performed by: UROLOGY

## 2023-05-22 RX ORDER — TAMSULOSIN HYDROCHLORIDE 0.4 MG/1
0.4 CAPSULE ORAL DAILY
Qty: 90 CAPSULE | Refills: 3 | Status: SHIPPED | OUTPATIENT
Start: 2023-05-22

## 2023-05-22 RX ORDER — FINASTERIDE 5 MG/1
5 TABLET, FILM COATED ORAL DAILY
Qty: 90 TABLET | Refills: 3 | Status: SHIPPED | OUTPATIENT
Start: 2023-05-22

## 2023-05-22 ASSESSMENT — ENCOUNTER SYMPTOMS: BACK PAIN: 0

## 2023-05-22 NOTE — PROGRESS NOTES
[facility]  [address] [facility]    [address] [place] [address], [address] [facility]  [address] [facility]  : 1935    Chief Complaint   Patient presents with    Follow-up          HPI     Richmond Hough is a 80 y.o. male with h/o bph and recurrent uti's who presented to [facility] 10/28/22 and was found to have UTI and 5 m LEFT proximal ureteral stone. LEFT ureteral stent was placed urgency 10/29/22 and he is currently scheduled for cystoscopy, LEFT ureteroscopy, laser lithotripsy, LEFT ureteral stent exchange with Dr. Blanche Schmitz on 22. He has had a urolift in the past and has recurrent uti's. Cystoscopy at time of cystoscopy, LEFT ureteroscopy, laser lithotripsy, LEFT ureteral stent exchange 22 revealed a prostate that would be amenable to TURP. He is thrilled in that he has had NO UTI since last visit. LUTS have improved. He is on tamsulosin/finasteride. He has been waking up at night SOB for the last 2 weeks. This is new for him. He does have a deviated septum, but has for years.        PVR: 22 407 ml,  330 ml             Past Medical History:   Diagnosis Date    BPH (benign prostatic hyperplasia)     Chronic bacterial prostatitis     Chronic kidney disease (CKD), stage III (moderate) (HCC)     Deviated nasal septum     Hearing loss sensory, bilateral     Hypertension     Hypertrophy of nasal turbinates     SARAVANAN (obstructive sleep apnea)     Subdural hematoma ([address] 2019    Type 2 diabetes mellitus ([address]     no medicaitons    Ureteral stone     Vestibular disequilibrium involving both inner ears      Past Surgical History:   Procedure Laterality Date    CATARACT EXTRACTION  2016    COLONOSCOPY      CYSTOSCOPY Left 2022    CYSTOSCOPY,LEFT URETEROSCOPY, LASER LITHOTRIPSY, LEFT URETERAL STENT EXCHANGE performed by Drew Moreno MD at [address]  10/29/2022    Tonya Arroyo     Current Outpatient Medications

## 2023-06-13 PROBLEM — R29.818 SUSPECTED SLEEP APNEA: Status: ACTIVE | Noted: 2023-06-13

## 2023-06-13 PROBLEM — R09.81 CHRONIC NASAL CONGESTION: Status: ACTIVE | Noted: 2023-06-13

## 2023-06-27 ENCOUNTER — HOSPITAL ENCOUNTER (OUTPATIENT)
Dept: SLEEP MEDICINE | Age: 88
Discharge: HOME | End: 2023-06-30
Payer: MEDICARE

## 2023-06-27 PROCEDURE — 95810 POLYSOM 6/> YRS 4/> PARAM: CPT

## 2023-07-10 ENCOUNTER — TELEPHONE (OUTPATIENT)
Dept: SLEEP MEDICINE | Age: 88
End: 2023-07-10

## 2023-07-10 ENCOUNTER — TELEPHONE (OUTPATIENT)
Dept: PULMONOLOGY | Age: 88
End: 2023-07-10

## 2023-07-12 NOTE — PROGRESS NOTES
[address] [place] [facility] Stefany [place], [address] [place] [address]  [phone]    Patient Name:  Delfina Nolasco  YOB: 1935      Office Visit 7/14/2023    CHIEF COMPLAINT:    Chief Complaint   Patient presents with    Follow-up    Sleep Study    Results    Sleep Apnea       HISTORY OF PRESENT ILLNESS:        The patient present in outpatient consultation at the request of Dr. Debra Bernard for evaluation and management of obstructive sleep apnea. The patient underwent a recent diagnostic polysomnography because of symptoms including  witnessed apneas, episodes of gasping for breath at nighttime and paroxysmal nocturnal dyspnea over the last 6 months. He also has episodes of waking up with a dry mouth and increasing daytime fatigue. Additional symptoms include chronic sinus congestion for which she is taking 2 different nasal spray including Flonase and Atrovent, using Vira pot. He describe longstanding history of of deviated septum and hypertrophy of his nasal turbinates. He was seen by ENT when he lived in [place] and they discussed possible surgery but he never pursued that. There is no history of cataplexy, hypnagogic hallucinations, or sleep paralysis. In addition, there is no history of frequent leg movements, kicking at night, or an inability to keep the legs still. He did have previous sleep study back in 2010 which showed severe sleep apnea with AHI was 53.3/hour and the lowest oxygen saturation was 79%. He tried CPAP machine at that time but he could not tolerate it because of his chronic nasal issues. Currently he go to sleep around 11:30 PM and he has 2 or 3 awakening every night. The most recent diagnostic polysomnography was notable for a respiratory disturbance index of 36.4/hour. Oxygen desaturations are low as 82% were noted. Significant cardiac arrhythmias were not evident.   The patient was noted to have no limb movements with a limb

## 2023-07-13 ENCOUNTER — TELEPHONE (OUTPATIENT)
Dept: SLEEP MEDICINE | Age: 88
End: 2023-07-13

## 2023-07-13 NOTE — TELEPHONE ENCOUNTER
Spoke to pt regarding severe sleep study results. Pt has appt with Dr. Joslyn Cevallos tomorrow and would like to wait until after appt before starting PAP therapy.

## 2023-07-14 ENCOUNTER — OFFICE VISIT (OUTPATIENT)
Dept: SLEEP MEDICINE | Age: 88
End: 2023-07-14
Payer: MEDICARE

## 2023-07-14 VITALS
HEIGHT: 68 IN | OXYGEN SATURATION: 96 % | DIASTOLIC BLOOD PRESSURE: 84 MMHG | WEIGHT: 194 LBS | RESPIRATION RATE: 14 BRPM | HEART RATE: 73 BPM | SYSTOLIC BLOOD PRESSURE: 132 MMHG | BODY MASS INDEX: 29.4 KG/M2

## 2023-07-14 DIAGNOSIS — J34.3 HYPERTROPHY OF NASAL TURBINATES: ICD-10-CM

## 2023-07-14 DIAGNOSIS — R79.89 ELEVATED TSH: ICD-10-CM

## 2023-07-14 DIAGNOSIS — R06.00 PND (PAROXYSMAL NOCTURNAL DYSPNEA): ICD-10-CM

## 2023-07-14 DIAGNOSIS — R09.81 CHRONIC NASAL CONGESTION: ICD-10-CM

## 2023-07-14 DIAGNOSIS — G47.34 NOCTURNAL HYPOXEMIA: ICD-10-CM

## 2023-07-14 DIAGNOSIS — G47.33 OSA (OBSTRUCTIVE SLEEP APNEA): Primary | ICD-10-CM

## 2023-07-14 PROCEDURE — 99215 OFFICE O/P EST HI 40 MIN: CPT | Performed by: INTERNAL MEDICINE

## 2023-07-14 PROCEDURE — G8417 CALC BMI ABV UP PARAM F/U: HCPCS | Performed by: INTERNAL MEDICINE

## 2023-07-14 PROCEDURE — 1123F ACP DISCUSS/DSCN MKR DOCD: CPT | Performed by: INTERNAL MEDICINE

## 2023-07-14 PROCEDURE — 1036F TOBACCO NON-USER: CPT | Performed by: INTERNAL MEDICINE

## 2023-07-14 PROCEDURE — G8427 DOCREV CUR MEDS BY ELIG CLIN: HCPCS | Performed by: INTERNAL MEDICINE

## 2023-07-14 RX ORDER — LEVOTHYROXINE SODIUM 0.1 MG/1
100 TABLET ORAL DAILY
COMMUNITY
Start: 2023-06-22

## 2023-07-14 NOTE — PATIENT INSTRUCTIONS
disclaims any warranty or liability for your use of this information. Content Version: 10.9.628067; Current as of: May 17, 2013                                    Learning About Physical Activity  What is physical activity? Physical activity is any kind of activity that gets your body moving. Being active means allowing your body to \"practice\" breathing, stretching, and lifting. The more practice your body gets, the better it works. The types of physical activity that can help you get fit and stay healthy include:  Aerobic or \"cardio\" activities that make your heart beat faster and make you breathe harder, such as brisk walking, riding a bike, or running. Aerobic activities strengthen your heart and lungs and build up your endurance. Strength training activities that make your muscles work against, or \"resist,\" something, such as lifting weights or doing push-ups. These activities help tone and strengthen your muscles. Stretches that allow you to move your joints and muscles through their full range of motion. Stretching helps you be more flexible and avoid injury. What are the benefits of physical activity? Being active is one of the best things you can do to get fit and stay healthy. It helps you to:  Feel stronger and have more energy to do all the things you like to do. Focus better at school or work and perform better in sports. Feel, think, and sleep better. Reach and stay at a healthy weight. Lose fat and build lean muscle. Lower your risk for serious health problems. Keep your bones, muscles, and joints strong. Being fit lets you do more physical activity. And it lets you work out harder without as much effort. How can you make physical activity part of your life? Get at least 30 minutes of exercise on most days of the week. Walking is a good choice. You also may want to do other activities, such as running, swimming, cycling, or playing tennis or team sports.   Pick activities that you EMT/paramedic

## 2023-07-19 ENCOUNTER — OFFICE VISIT (OUTPATIENT)
Dept: ENDOCRINOLOGY | Age: 88
End: 2023-07-19
Payer: MEDICARE

## 2023-07-19 VITALS
BODY MASS INDEX: 29.95 KG/M2 | OXYGEN SATURATION: 97 % | WEIGHT: 197 LBS | SYSTOLIC BLOOD PRESSURE: 160 MMHG | DIASTOLIC BLOOD PRESSURE: 100 MMHG | HEART RATE: 70 BPM

## 2023-07-19 DIAGNOSIS — E03.9 PRIMARY HYPOTHYROIDISM: Primary | ICD-10-CM

## 2023-07-19 DIAGNOSIS — E04.1 THYROID NODULE: ICD-10-CM

## 2023-07-19 PROCEDURE — 1123F ACP DISCUSS/DSCN MKR DOCD: CPT | Performed by: INTERNAL MEDICINE

## 2023-07-19 PROCEDURE — G8417 CALC BMI ABV UP PARAM F/U: HCPCS | Performed by: INTERNAL MEDICINE

## 2023-07-19 PROCEDURE — G8427 DOCREV CUR MEDS BY ELIG CLIN: HCPCS | Performed by: INTERNAL MEDICINE

## 2023-07-19 PROCEDURE — 99204 OFFICE O/P NEW MOD 45 MIN: CPT | Performed by: INTERNAL MEDICINE

## 2023-07-19 PROCEDURE — 1036F TOBACCO NON-USER: CPT | Performed by: INTERNAL MEDICINE

## 2023-07-19 PROCEDURE — 76536 US EXAM OF HEAD AND NECK: CPT | Performed by: INTERNAL MEDICINE

## 2023-07-19 RX ORDER — LEVOTHYROXINE SODIUM 0.1 MG/1
100 TABLET ORAL DAILY
Qty: 90 TABLET | Refills: 3
Start: 2023-07-19

## 2023-07-19 ASSESSMENT — ENCOUNTER SYMPTOMS
DIARRHEA: 0
CONSTIPATION: 0

## 2023-08-17 ENCOUNTER — TELEPHONE (OUTPATIENT)
Dept: SLEEP MEDICINE | Age: 88
End: 2023-08-17

## 2023-08-17 NOTE — TELEPHONE ENCOUNTER
Pt states that he believes he is having PMD and some problems with lungs. He wants to see status of lungs. He is concerned about heart. He is seen in pulmonary and sleep. He believes his CPAP is causing some issue? ? Please call to discuss. I feel he needs to be seen in sleep.

## 2023-08-28 NOTE — PROGRESS NOTES
[address] [facility] [place], [address] [place] [address]  [phone]    Patient Name:  Paz Hernandes  YOB: 1935      Office Visit 8/30/2023    CHIEF COMPLAINT:      Chief Complaint   Patient presents with    Follow-up    Sleep Apnea    CPAP/BiPAP       HISTORY OF PRESENT ILLNESS:        The patient present for evaluation and management of obstructive sleep apnea. The patient underwent a recent diagnostic polysomnography which was notable for a respiratory disturbance index of 36.4/hour. Oxygen desaturations are low as 82% were noted. Significant cardiac arrhythmias were not evident. The patient was noted to have no limb movements with a limb movement arousal index being about 0.0. His sleep efficiency during the study was very poor at 38%. He also has episodes of waking up with a dry mouth and increasing daytime fatigue. Additional symptoms include chronic sinus congestion for which she is taking 2 different nasal spray including Flonase and Atrovent, using Nabb pot. He describe longstanding history of of deviated septum and hypertrophy of his nasal turbinates. He was seen by ENT when he lived in [place] and they discussed possible surgery but he never pursued that. He did have previous sleep study back in 2010 which showed severe sleep apnea with AHI was 53.3/hour and the lowest oxygen saturation was 79%. He tried CPAP machine at that time but he could not tolerate it because of his chronic nasal issues. Currently he go to sleep around 11:30 PM and he has 2 or 3 awakening every night. We reviewed pathophysiology of sleep apnea and analyzed most of his symptoms including paroxysmal nocturnal dyspnea and possible orthopnea which is likely related to pulmonary vascular congestion at nighttime when he is sleeping in the supine position along with his apnea episodes.  He also indicated that his primary care physician monitor his blood work on

## 2023-08-30 ENCOUNTER — OFFICE VISIT (OUTPATIENT)
Dept: SLEEP MEDICINE | Age: 88
End: 2023-08-30
Payer: MEDICARE

## 2023-08-30 VITALS
WEIGHT: 198 LBS | HEIGHT: 68 IN | BODY MASS INDEX: 30.01 KG/M2 | SYSTOLIC BLOOD PRESSURE: 142 MMHG | OXYGEN SATURATION: 96 % | DIASTOLIC BLOOD PRESSURE: 80 MMHG | HEART RATE: 85 BPM | RESPIRATION RATE: 14 BRPM

## 2023-08-30 DIAGNOSIS — G47.33 OSA (OBSTRUCTIVE SLEEP APNEA): Primary | ICD-10-CM

## 2023-08-30 DIAGNOSIS — G47.34 NOCTURNAL HYPOXEMIA: ICD-10-CM

## 2023-08-30 DIAGNOSIS — Z87.898 HISTORY OF ORTHOPNEA: ICD-10-CM

## 2023-08-30 DIAGNOSIS — R06.00 PND (PAROXYSMAL NOCTURNAL DYSPNEA): ICD-10-CM

## 2023-08-30 DIAGNOSIS — J34.3 HYPERTROPHY OF NASAL TURBINATES: ICD-10-CM

## 2023-08-30 PROCEDURE — 99215 OFFICE O/P EST HI 40 MIN: CPT | Performed by: INTERNAL MEDICINE

## 2023-08-30 PROCEDURE — G8427 DOCREV CUR MEDS BY ELIG CLIN: HCPCS | Performed by: INTERNAL MEDICINE

## 2023-08-30 PROCEDURE — 1036F TOBACCO NON-USER: CPT | Performed by: INTERNAL MEDICINE

## 2023-08-30 PROCEDURE — G8417 CALC BMI ABV UP PARAM F/U: HCPCS | Performed by: INTERNAL MEDICINE

## 2023-08-30 PROCEDURE — 1123F ACP DISCUSS/DSCN MKR DOCD: CPT | Performed by: INTERNAL MEDICINE

## 2023-08-30 ASSESSMENT — SLEEP AND FATIGUE QUESTIONNAIRES
HOW LIKELY ARE YOU TO NOD OFF OR FALL ASLEEP WHEN YOU ARE A PASSENGER IN A CAR FOR AN HOUR WITHOUT A BREAK: 1
HOW LIKELY ARE YOU TO NOD OFF OR FALL ASLEEP WHILE SITTING AND TALKING TO SOMEONE: 0
HOW LIKELY ARE YOU TO NOD OFF OR FALL ASLEEP WHILE WATCHING TV: 2
HOW LIKELY ARE YOU TO NOD OFF OR FALL ASLEEP WHILE SITTING QUIETLY AFTER LUNCH WITHOUT ALCOHOL: 0
HOW LIKELY ARE YOU TO NOD OFF OR FALL ASLEEP IN A CAR, WHILE STOPPED FOR A FEW MINUTES IN TRAFFIC: 1
ESS TOTAL SCORE: 8
HOW LIKELY ARE YOU TO NOD OFF OR FALL ASLEEP WHILE SITTING AND READING: 2
HOW LIKELY ARE YOU TO NOD OFF OR FALL ASLEEP WHILE SITTING INACTIVE IN A PUBLIC PLACE: 0
HOW LIKELY ARE YOU TO NOD OFF OR FALL ASLEEP WHILE LYING DOWN TO REST IN THE AFTERNOON WHEN CIRCUMSTANCES PERMIT: 2

## 2023-09-10 ASSESSMENT — ENCOUNTER SYMPTOMS
DIARRHEA: 0
VOMITING: 0
NAUSEA: 0
COUGH: 0
PHOTOPHOBIA: 0
WHEEZING: 0
CONSTIPATION: 0
ABDOMINAL PAIN: 0
ABDOMINAL DISTENTION: 0

## 2023-09-10 NOTE — PROGRESS NOTES
Normal range of motion. Cervical back: Normal range of motion and neck supple. No tenderness. Comments: Trace anterior tibial and ankle edema bilaterally   Skin:     General: Skin is warm and dry. Neurological:      General: No focal deficit present. Mental Status: He is alert and oriented to person, place, and time. Psychiatric:         Mood and Affect: Mood normal.         Behavior: Behavior normal.          Medical problems and test results were reviewed with the patient today. No results found for: \"CHOL\"  No results found for: \"TRIG\"  No results found for: \"HDL\"  No results found for: \"LDLCHOLESTEROL\", \"LDLCALC\"  No results found for: \"LABVLDL\", \"VLDL\"  No results found for: \"CHOLHDLRATIO\"     Lab Results   Component Value Date/Time     12/03/2022 03:32 PM    K 4.6 12/03/2022 03:32 PM     12/03/2022 03:32 PM    CO2 19 12/03/2022 03:32 PM    BUN 44 12/03/2022 03:32 PM    CREATININE 2.48 12/03/2022 03:32 PM    GLUCOSE 194 12/03/2022 03:32 PM    CALCIUM 9.2 12/03/2022 03:32 PM         Lab Results   Component Value Date    WBC 10.1 12/03/2022    HGB 13.1 (L) 12/03/2022    HCT 39.3 (L) 12/03/2022    MCV 87.1 12/03/2022     12/03/2022        No results found for: \"TSHFT4\", \"TSH\"     No results found for: \"LABA1C\"    Results for orders placed or performed in visit on 09/12/23   EKG 12 Lead    Impression    Sinus  Bradycardia  -First degree A-V block 54 bpm  Normal axis  -Right bundle branch block. Nonspecific ST-T wave changes        ASSESSMENT and PLAN    Schuyler was seen today for consultation and shortness of breath. Diagnoses and all orders for this visit:    SARAVANAN (obstructive sleep apnea)-fairly significant sleep apnea, trying hard to adapt and adjust to CPAP. Assess RV size, function, PA pressures with echo. Check BNP.  -     EKG 12 Lead  -     Echo (TTE) complete with contrast, bubble, strain, and 3D PRN;  Future  -     Brain Natriuretic Peptide;

## 2023-09-12 ENCOUNTER — INITIAL CONSULT (OUTPATIENT)
Age: 88
End: 2023-09-12
Payer: MEDICARE

## 2023-09-12 VITALS
HEART RATE: 54 BPM | SYSTOLIC BLOOD PRESSURE: 128 MMHG | DIASTOLIC BLOOD PRESSURE: 70 MMHG | WEIGHT: 196.2 LBS | BODY MASS INDEX: 29.73 KG/M2 | HEIGHT: 68 IN

## 2023-09-12 DIAGNOSIS — I10 PRIMARY HYPERTENSION: ICD-10-CM

## 2023-09-12 DIAGNOSIS — R06.00 PND (PAROXYSMAL NOCTURNAL DYSPNEA): ICD-10-CM

## 2023-09-12 DIAGNOSIS — G47.34 NOCTURNAL HYPOXEMIA: ICD-10-CM

## 2023-09-12 DIAGNOSIS — R06.02 SHORTNESS OF BREATH: ICD-10-CM

## 2023-09-12 DIAGNOSIS — G47.33 OSA (OBSTRUCTIVE SLEEP APNEA): Primary | ICD-10-CM

## 2023-09-12 DIAGNOSIS — E03.9 PRIMARY HYPOTHYROIDISM: ICD-10-CM

## 2023-09-12 PROCEDURE — 1123F ACP DISCUSS/DSCN MKR DOCD: CPT | Performed by: INTERNAL MEDICINE

## 2023-09-12 PROCEDURE — 1036F TOBACCO NON-USER: CPT | Performed by: INTERNAL MEDICINE

## 2023-09-12 PROCEDURE — 93000 ELECTROCARDIOGRAM COMPLETE: CPT | Performed by: INTERNAL MEDICINE

## 2023-09-12 PROCEDURE — G8417 CALC BMI ABV UP PARAM F/U: HCPCS | Performed by: INTERNAL MEDICINE

## 2023-09-12 PROCEDURE — 99205 OFFICE O/P NEW HI 60 MIN: CPT | Performed by: INTERNAL MEDICINE

## 2023-09-12 PROCEDURE — G8427 DOCREV CUR MEDS BY ELIG CLIN: HCPCS | Performed by: INTERNAL MEDICINE

## 2023-09-12 ASSESSMENT — ENCOUNTER SYMPTOMS: SHORTNESS OF BREATH: 1

## 2023-09-13 DIAGNOSIS — R06.02 SHORTNESS OF BREATH: ICD-10-CM

## 2023-09-13 DIAGNOSIS — G47.33 OSA (OBSTRUCTIVE SLEEP APNEA): ICD-10-CM

## 2023-09-13 DIAGNOSIS — R06.00 PND (PAROXYSMAL NOCTURNAL DYSPNEA): ICD-10-CM

## 2023-09-13 DIAGNOSIS — G47.34 NOCTURNAL HYPOXEMIA: ICD-10-CM

## 2023-09-13 LAB — NT PRO BNP: 340 PG/ML

## 2023-10-09 ENCOUNTER — TELEPHONE (OUTPATIENT)
Age: 88
End: 2023-10-09

## 2023-10-11 NOTE — TELEPHONE ENCOUNTER
They are both fine, no major abnormalities. He is seeing me in 2 days.   Just keep follow-up and we will discuss at length

## 2023-10-12 ASSESSMENT — ENCOUNTER SYMPTOMS
PHOTOPHOBIA: 0
ABDOMINAL PAIN: 0
COUGH: 0
SHORTNESS OF BREATH: 1
ABDOMINAL DISTENTION: 0
DIARRHEA: 0
WHEEZING: 0
CONSTIPATION: 0
VOMITING: 0
NAUSEA: 0

## 2023-10-12 NOTE — PROGRESS NOTES
adjust to CPAP  but still having issues with tolerance and compliance. Adding torsemide as well, see below. He will inquire with his sleep MDs about potentially getting worked up for [employer] device    Nocturnal hypoxemia-see above-, adding torsemide. Primary hypothyroidism-continue hormone replacement with surveillance per PCP    Primary hypertension-fairly euvolemic today with excellent blood pressure. Continue current dose of amlodipine and losartan. Minimize sodium and stay hydrated. Adding low-dose diuretic but needs close reassessment of creatinine within 7 days. PND (paroxysmal nocturnal dyspnea)-lower extremity edema likely dependent edema due to chronic kidney disease and high-dose amlodipine. Blood pressure well controlled. EF normal but diastolic function abnormal with . Valvular function stable. No ischemia on nuclear stress test.  Add torsemide 20 mg daily, minimize dietary sodium, stay hydrated, check BMP in 1 week and call me in a week with update on symptoms. Stop torsemide if renal function worse, potassium higher, or no improvement in dyspnea. Shortness of breath-as above      Return in about 6 months (around 4/13/2024).          Rory Sevilla MD  10/13/23  8:58 AM

## 2023-10-13 ENCOUNTER — OFFICE VISIT (OUTPATIENT)
Age: 88
End: 2023-10-13
Payer: MEDICARE

## 2023-10-13 VITALS
BODY MASS INDEX: 29.86 KG/M2 | HEIGHT: 68 IN | DIASTOLIC BLOOD PRESSURE: 74 MMHG | SYSTOLIC BLOOD PRESSURE: 134 MMHG | WEIGHT: 197 LBS | HEART RATE: 72 BPM

## 2023-10-13 DIAGNOSIS — G47.33 OSA (OBSTRUCTIVE SLEEP APNEA): ICD-10-CM

## 2023-10-13 DIAGNOSIS — R06.00 PND (PAROXYSMAL NOCTURNAL DYSPNEA): ICD-10-CM

## 2023-10-13 DIAGNOSIS — I10 PRIMARY HYPERTENSION: Primary | ICD-10-CM

## 2023-10-13 DIAGNOSIS — E03.9 PRIMARY HYPOTHYROIDISM: ICD-10-CM

## 2023-10-13 PROCEDURE — 99214 OFFICE O/P EST MOD 30 MIN: CPT | Performed by: INTERNAL MEDICINE

## 2023-10-13 PROCEDURE — G8427 DOCREV CUR MEDS BY ELIG CLIN: HCPCS | Performed by: INTERNAL MEDICINE

## 2023-10-13 PROCEDURE — 1123F ACP DISCUSS/DSCN MKR DOCD: CPT | Performed by: INTERNAL MEDICINE

## 2023-10-13 PROCEDURE — G8417 CALC BMI ABV UP PARAM F/U: HCPCS | Performed by: INTERNAL MEDICINE

## 2023-10-13 PROCEDURE — 1036F TOBACCO NON-USER: CPT | Performed by: INTERNAL MEDICINE

## 2023-10-13 PROCEDURE — G8484 FLU IMMUNIZE NO ADMIN: HCPCS | Performed by: INTERNAL MEDICINE

## 2023-10-13 RX ORDER — POTASSIUM CHLORIDE 20 MEQ/1
20 TABLET, EXTENDED RELEASE ORAL DAILY
Qty: 30 TABLET | Refills: 11 | Status: SHIPPED | OUTPATIENT
Start: 2023-10-13 | End: 2023-10-13

## 2023-10-13 RX ORDER — TORSEMIDE 20 MG/1
20 TABLET ORAL DAILY
Qty: 30 TABLET | Refills: 3 | Status: SHIPPED | OUTPATIENT
Start: 2023-10-13

## 2023-10-13 RX ORDER — CHLORTHALIDONE 25 MG/1
25 TABLET ORAL DAILY
Qty: 30 TABLET | Refills: 11 | Status: SHIPPED | OUTPATIENT
Start: 2023-10-13 | End: 2023-10-13

## 2023-10-16 ENCOUNTER — TELEPHONE (OUTPATIENT)
Dept: ENDOCRINOLOGY | Age: 88
End: 2023-10-16

## 2023-10-16 NOTE — TELEPHONE ENCOUNTER
Maggie called very upset stating that nobody ever told him he will need to do labs before his next appointment. He was asking does he need to do labs and if he has any labs to do. I called him back letting him know that he does have labs to do and that he can do them at any [facility] lab and have them drawn. Or he can do it before his appointment tomorrow or after if he can not get around to do labs today. And to call us back with any questions.

## 2023-10-17 ENCOUNTER — OFFICE VISIT (OUTPATIENT)
Dept: ENDOCRINOLOGY | Age: 88
End: 2023-10-17
Payer: MEDICARE

## 2023-10-17 VITALS
BODY MASS INDEX: 30.26 KG/M2 | WEIGHT: 199 LBS | OXYGEN SATURATION: 95 % | DIASTOLIC BLOOD PRESSURE: 80 MMHG | HEART RATE: 83 BPM | SYSTOLIC BLOOD PRESSURE: 125 MMHG

## 2023-10-17 DIAGNOSIS — E03.9 PRIMARY HYPOTHYROIDISM: Primary | ICD-10-CM

## 2023-10-17 DIAGNOSIS — E04.1 THYROID NODULE: ICD-10-CM

## 2023-10-17 PROCEDURE — G8417 CALC BMI ABV UP PARAM F/U: HCPCS | Performed by: INTERNAL MEDICINE

## 2023-10-17 PROCEDURE — 1123F ACP DISCUSS/DSCN MKR DOCD: CPT | Performed by: INTERNAL MEDICINE

## 2023-10-17 PROCEDURE — G8427 DOCREV CUR MEDS BY ELIG CLIN: HCPCS | Performed by: INTERNAL MEDICINE

## 2023-10-17 PROCEDURE — 99214 OFFICE O/P EST MOD 30 MIN: CPT | Performed by: INTERNAL MEDICINE

## 2023-10-17 PROCEDURE — G8484 FLU IMMUNIZE NO ADMIN: HCPCS | Performed by: INTERNAL MEDICINE

## 2023-10-17 PROCEDURE — 1036F TOBACCO NON-USER: CPT | Performed by: INTERNAL MEDICINE

## 2023-10-17 RX ORDER — LEVOTHYROXINE SODIUM 112 UG/1
112 TABLET ORAL DAILY
Qty: 90 TABLET | Refills: 3 | Status: SHIPPED | OUTPATIENT
Start: 2023-10-17

## 2023-10-17 ASSESSMENT — ENCOUNTER SYMPTOMS
CONSTIPATION: 0
DIARRHEA: 0

## 2023-10-17 NOTE — PROGRESS NOTES
evening. 1 each 11    finasteride (PROSCAR) 5 MG tablet Take 1 tablet by mouth daily 90 tablet 3    tamsulosin (FLOMAX) 0.4 MG capsule Take 1 capsule by mouth daily 90 capsule 3    amLODIPine (NORVASC) 10 MG tablet Take by mouth daily      losartan (COZAAR) 50 MG tablet TAKE 1 TABLET BY MOUTH EVERY DAY       No current facility-administered medications for this visit.

## 2023-10-26 ENCOUNTER — TELEPHONE (OUTPATIENT)
Dept: SLEEP MEDICINE | Age: 88
End: 2023-10-26

## 2023-10-26 DIAGNOSIS — J34.3 HYPERTROPHY OF NASAL TURBINATES: Primary | ICD-10-CM

## 2023-10-26 DIAGNOSIS — R09.81 CHRONIC NASAL CONGESTION: ICD-10-CM

## 2023-10-26 NOTE — TELEPHONE ENCOUNTER
Patient states he is struggling with cpap. Needs appt asap. Schedule patient with Dr. Ball Home for 11/1/23. He is also requesting a Referral to ENT for . Hypertrophy of nasal turbinates and chronic nasal congestion. Will send a referral to [facility].

## 2023-10-30 ENCOUNTER — TELEPHONE (OUTPATIENT)
Dept: ENT CLINIC | Age: 88
End: 2023-10-30

## 2023-10-30 NOTE — TELEPHONE ENCOUNTER
Left a voicemail for this patient regarding his missed appointment with Dr. Shankar Rowland this morning and asked him to call if he needs to reschedule.

## 2023-10-31 NOTE — PROGRESS NOTES
[facility] sleep disorder center  [address] [place] [address]  [phone]    Patient Name:  Dante Barlow  YOB: 1935      Office Visit 11/1/2023    Dante Barlow, was evaluated through a synchronous (real-time) audio-video encounter. The patient (or guardian if applicable) is aware that this is a billable service, which includes applicable co-pays. This Virtual Visit was conducted with patient's (and/or legal guardian's) consent. Patient identification was verified, and a caregiver was present when appropriate. The patient was located at Home: [address] [place] [address]  Provider was located at Other: Total time spent for this encounter:  30 minutes    --Dori Arnold MD on 11/1/2023 at 10:50 AM    An electronic signature was used to authenticate this note. CHIEF COMPLAINT:      Chief Complaint   Patient presents with    Follow-up    Sleep Apnea    CPAP/BiPAP        HISTORY OF PRESENT ILLNESS:        The patient is evaluated virtually for  management of obstructive sleep apnea. The patient underwent a recent diagnostic polysomnography which was notable for a respiratory disturbance index of 36.4/hour. Oxygen desaturations are low as 82% were noted. Significant cardiac arrhythmias were not evident. The patient was noted to have no limb movements with a limb movement arousal index being about 0.0. His sleep efficiency during the study was very poor at 38%. He also has episodes of waking up with a dry mouth and increasing daytime fatigue. Additional symptoms include chronic sinus congestion for which she is taking 2 different nasal spray including Flonase and Atrovent, using Vira pot. He describe longstanding history of of deviated septum and hypertrophy of his nasal turbinates. He was seen by ENT when he lived in [place] and they discussed possible surgery but he never pursued that.   He did have previous sleep study back in

## 2023-11-01 ENCOUNTER — TELEMEDICINE (OUTPATIENT)
Dept: SLEEP MEDICINE | Age: 88
End: 2023-11-01
Payer: MEDICARE

## 2023-11-01 DIAGNOSIS — G47.33 OSA (OBSTRUCTIVE SLEEP APNEA): Primary | ICD-10-CM

## 2023-11-01 DIAGNOSIS — J34.3 HYPERTROPHY OF NASAL TURBINATES: ICD-10-CM

## 2023-11-01 DIAGNOSIS — G47.34 NOCTURNAL HYPOXEMIA: ICD-10-CM

## 2023-11-01 PROCEDURE — G8427 DOCREV CUR MEDS BY ELIG CLIN: HCPCS | Performed by: INTERNAL MEDICINE

## 2023-11-01 PROCEDURE — 99214 OFFICE O/P EST MOD 30 MIN: CPT | Performed by: INTERNAL MEDICINE

## 2023-11-01 PROCEDURE — 1123F ACP DISCUSS/DSCN MKR DOCD: CPT | Performed by: INTERNAL MEDICINE

## 2023-11-01 ASSESSMENT — SLEEP AND FATIGUE QUESTIONNAIRES
ESS TOTAL SCORE: 6
HOW LIKELY ARE YOU TO NOD OFF OR FALL ASLEEP WHILE SITTING INACTIVE IN A PUBLIC PLACE: 0
HOW LIKELY ARE YOU TO NOD OFF OR FALL ASLEEP IN A CAR, WHILE STOPPED FOR A FEW MINUTES IN TRAFFIC: 0
HOW LIKELY ARE YOU TO NOD OFF OR FALL ASLEEP WHILE WATCHING TV: 3
HOW LIKELY ARE YOU TO NOD OFF OR FALL ASLEEP WHILE SITTING AND TALKING TO SOMEONE: 0
HOW LIKELY ARE YOU TO NOD OFF OR FALL ASLEEP WHILE SITTING AND READING: 0
HOW LIKELY ARE YOU TO NOD OFF OR FALL ASLEEP WHEN YOU ARE A PASSENGER IN A CAR FOR AN HOUR WITHOUT A BREAK: 0
HOW LIKELY ARE YOU TO NOD OFF OR FALL ASLEEP WHILE SITTING QUIETLY AFTER LUNCH WITHOUT ALCOHOL: 0
HOW LIKELY ARE YOU TO NOD OFF OR FALL ASLEEP WHILE LYING DOWN TO REST IN THE AFTERNOON WHEN CIRCUMSTANCES PERMIT: 3

## 2023-11-02 ENCOUNTER — OFFICE VISIT (OUTPATIENT)
Dept: ENT CLINIC | Age: 88
End: 2023-11-02

## 2023-11-02 VITALS
WEIGHT: 201 LBS | HEART RATE: 60 BPM | RESPIRATION RATE: 17 BRPM | OXYGEN SATURATION: 98 % | BODY MASS INDEX: 30.46 KG/M2 | HEIGHT: 68 IN

## 2023-11-02 DIAGNOSIS — J34.89 NASAL OBSTRUCTION: Primary | ICD-10-CM

## 2023-11-02 DIAGNOSIS — J34.2 DEVIATED NASAL SEPTUM: ICD-10-CM

## 2023-11-02 DIAGNOSIS — G47.33 OSA (OBSTRUCTIVE SLEEP APNEA): ICD-10-CM

## 2023-11-02 RX ORDER — LEVOTHYROXINE SODIUM 0.1 MG/1
100 TABLET ORAL DAILY
COMMUNITY
Start: 2023-06-22

## 2023-11-02 RX ORDER — POTASSIUM CHLORIDE 1500 MG/1
20 TABLET, EXTENDED RELEASE ORAL DAILY
COMMUNITY
Start: 2023-10-13

## 2023-11-02 RX ORDER — CHLORTHALIDONE 25 MG/1
25 TABLET ORAL DAILY
COMMUNITY
Start: 2023-10-13

## 2023-11-02 ASSESSMENT — ENCOUNTER SYMPTOMS
EYE PAIN: 0
DIARRHEA: 0
CHOKING: 0
SINUS PAIN: 0
SHORTNESS OF BREATH: 0
WHEEZING: 0
EYE ITCHING: 0
NAUSEA: 0
SINUS PRESSURE: 0
EYE DISCHARGE: 0
FACIAL SWELLING: 0
CONSTIPATION: 0
STRIDOR: 0
APNEA: 0
COUGH: 0

## 2023-11-02 NOTE — PROGRESS NOTES
HPI:  Case Weaver is a 80 y.o. male seen New    Chief Complaint   Patient presents with    Congestion     Patient presents today with c/o nasal congestion , he currently uses CPAP and is having difficulty with masks and adjusting . Beginning in May he began having sleep disturbances that and breathing difficulty . 59-year-old male seen as a new patient referral evaluation with concern of SARAVANAN with the use of CPAP. He has had ongoing nasal congestion following his diagnosis of SARAVANAN earlier this year. He has struggled with tolerating his CPAP due to the nasal airway obstruction and is here today for ENT referral to establish an exam with his nose to see if there is any potential areas of concern. He denies any significant nasal trauma history. He has nasal congestion equal to both sides. It is certainly worse when he lies down at night. There has been trials of both Atrovent and Flonase nasal spray without a notable benefit. Past Medical History, Past Surgical History, Family history, Social History, and Medications were all reviewed with the patient today and updated as necessary.      No Known Allergies    Patient Active Problem List   Diagnosis    Kidney stone    SARAVANAN (obstructive sleep apnea)    Chronic nasal congestion    Nocturnal hypoxemia    Hypertrophy of nasal turbinates    Primary hypothyroidism    PND (paroxysmal nocturnal dyspnea)    Thyroid nodule    History of orthopnea    Hypertension    Shortness of breath       Current Outpatient Medications   Medication Sig    KLOR-CON M20 20 MEQ extended release tablet Take 1 tablet by mouth daily    levothyroxine (SYNTHROID) 100 MCG tablet Take 1 tablet by mouth daily    chlorthalidone (HYGROTON) 25 MG tablet Take 1 tablet by mouth daily    levothyroxine (SYNTHROID) 112 MCG tablet Take 1 tablet by mouth daily    torsemide (DEMADEX) 20 MG tablet Take 1 tablet by mouth daily    ipratropium (ATROVENT) 0.03 % nasal spray 2 sprays by Each Nostril

## 2023-11-03 DIAGNOSIS — N13.8 BPH WITH OBSTRUCTION/LOWER URINARY TRACT SYMPTOMS: ICD-10-CM

## 2023-11-03 DIAGNOSIS — R09.81 CHRONIC NASAL CONGESTION: ICD-10-CM

## 2023-11-03 DIAGNOSIS — N40.1 BPH WITH OBSTRUCTION/LOWER URINARY TRACT SYMPTOMS: ICD-10-CM

## 2023-11-03 RX ORDER — TAMSULOSIN HYDROCHLORIDE 0.4 MG/1
0.4 CAPSULE ORAL DAILY
Qty: 90 CAPSULE | Refills: 3 | OUTPATIENT
Start: 2023-11-03

## 2023-11-03 NOTE — TELEPHONE ENCOUNTER
Patient Refill Request     Last Office Visit: 06/13/23 with Sine     When they were supposed to follow up:  PRN     Upcoming Office Visit: None     Refills Requested: Flonase Nasal [place]     Type of refill: 30 day     Requested Pharmacy: [address] [place] [address]     Is prescription pended?  Yes

## 2023-11-06 RX ORDER — FLUTICASONE PROPIONATE 50 MCG
1 SPRAY, SUSPENSION (ML) NASAL DAILY
Qty: 1 EACH | Refills: 11 | Status: SHIPPED | OUTPATIENT
Start: 2023-11-06

## 2024-01-08 RX ORDER — TORSEMIDE 20 MG/1
20 TABLET ORAL DAILY
Qty: 90 TABLET | Refills: 3 | Status: SHIPPED | OUTPATIENT
Start: 2024-01-08

## 2024-01-28 ENCOUNTER — HOSPITAL ENCOUNTER (INPATIENT)
Age: 89
LOS: 3 days | Discharge: HOME HEALTH | DRG: 872 | End: 2024-01-31
Attending: INTERNAL MEDICINE | Admitting: FAMILY MEDICINE
Payer: MEDICARE

## 2024-01-28 ENCOUNTER — HOSPITAL ENCOUNTER (EMERGENCY)
Age: 89
Discharge: ANOTHER HOSPITAL | End: 2024-01-28
Attending: EMERGENCY MEDICINE
Payer: MEDICARE

## 2024-01-28 ENCOUNTER — APPOINTMENT (OUTPATIENT)
Dept: GENERAL RADIOLOGY | Age: 89
End: 2024-01-28
Payer: MEDICARE

## 2024-01-28 ENCOUNTER — APPOINTMENT (OUTPATIENT)
Dept: CT IMAGING | Age: 89
End: 2024-01-28
Payer: MEDICARE

## 2024-01-28 VITALS
HEIGHT: 68 IN | WEIGHT: 192 LBS | DIASTOLIC BLOOD PRESSURE: 99 MMHG | RESPIRATION RATE: 18 BRPM | TEMPERATURE: 98.2 F | SYSTOLIC BLOOD PRESSURE: 129 MMHG | HEART RATE: 63 BPM | BODY MASS INDEX: 29.1 KG/M2 | OXYGEN SATURATION: 98 %

## 2024-01-28 DIAGNOSIS — N30.91 CYSTITIS WITH HEMATURIA: ICD-10-CM

## 2024-01-28 DIAGNOSIS — A41.9 SEPSIS, DUE TO UNSPECIFIED ORGANISM, UNSPECIFIED WHETHER ACUTE ORGAN DYSFUNCTION PRESENT (HCC): Primary | ICD-10-CM

## 2024-01-28 DIAGNOSIS — N13.4 HYDROURETER, LEFT: ICD-10-CM

## 2024-01-28 DIAGNOSIS — N20.1 LEFT URETERAL STONE: ICD-10-CM

## 2024-01-28 PROBLEM — R80.1 PERSISTENT PROTEINURIA: Status: ACTIVE | Noted: 2022-02-16

## 2024-01-28 PROBLEM — R31.29 MICROHEMATURIA: Chronic | Status: ACTIVE | Noted: 2023-12-12

## 2024-01-28 PROBLEM — N41.1 CHRONIC BACTERIAL PROSTATITIS: Status: ACTIVE | Noted: 2022-10-27

## 2024-01-28 PROBLEM — E66.9 OBESITY, CLASS I, BMI 30-34.9: Status: ACTIVE | Noted: 2017-12-18

## 2024-01-28 PROBLEM — N13.30 HYDRONEPHROSIS: Status: ACTIVE | Noted: 2022-10-28

## 2024-01-28 PROBLEM — R60.0 LOCALIZED EDEMA: Chronic | Status: ACTIVE | Noted: 2023-12-12

## 2024-01-28 PROBLEM — E66.811 OBESITY, CLASS I, BMI 30-34.9: Status: ACTIVE | Noted: 2017-12-18

## 2024-01-28 PROBLEM — N18.4 CHRONIC KIDNEY DISEASE, STAGE IV (SEVERE) (HCC): Status: ACTIVE | Noted: 2022-02-16

## 2024-01-28 PROBLEM — E87.20 METABOLIC ACIDOSIS: Status: ACTIVE | Noted: 2022-09-13

## 2024-01-28 PROBLEM — H90.3 SENSORINEURAL HEARING LOSS (SNHL) OF BOTH EARS: Status: ACTIVE | Noted: 2018-02-02

## 2024-01-28 PROBLEM — N18.32 STAGE 3B CHRONIC KIDNEY DISEASE (HCC): Chronic | Status: RESOLVED | Noted: 2020-09-17 | Resolved: 2024-01-28

## 2024-01-28 PROBLEM — R59.0 LAD (LYMPHADENOPATHY) OF LEFT CERVICAL REGION: Status: ACTIVE | Noted: 2023-03-13

## 2024-01-28 PROBLEM — N39.0 COMPLICATED UTI (URINARY TRACT INFECTION): Status: ACTIVE | Noted: 2024-01-28

## 2024-01-28 PROBLEM — N18.32 STAGE 3B CHRONIC KIDNEY DISEASE (HCC): Chronic | Status: ACTIVE | Noted: 2020-09-17

## 2024-01-28 LAB
ALBUMIN SERPL-MCNC: 3.8 G/DL (ref 3.2–4.6)
ALBUMIN/GLOB SERPL: 1.3 (ref 0.4–1.6)
ALP SERPL-CCNC: 89 U/L (ref 45–117)
ALT SERPL-CCNC: 15 U/L (ref 13–61)
ANION GAP SERPL CALC-SCNC: 12 MMOL/L (ref 2–11)
APPEARANCE UR: ABNORMAL
AST SERPL-CCNC: 11 U/L (ref 15–37)
BACTERIA URNS QL MICRO: ABNORMAL /HPF
BASOPHILS # BLD: 0 K/UL (ref 0–0.2)
BASOPHILS NFR BLD: 0 % (ref 0–2)
BILIRUB SERPL-MCNC: 1 MG/DL (ref 0.2–1.1)
BILIRUB UR QL: NEGATIVE
BUN SERPL-MCNC: 38 MG/DL (ref 8–23)
CALCIUM SERPL-MCNC: 8.8 MG/DL (ref 8.3–10.4)
CASTS URNS QL MICRO: 0 /LPF
CHLORIDE SERPL-SCNC: 101 MMOL/L (ref 98–107)
CO2 SERPL-SCNC: 22 MMOL/L (ref 21–32)
COLOR UR: YELLOW
CREAT SERPL-MCNC: 2.62 MG/DL (ref 0.8–1.5)
CRYSTALS URNS QL MICRO: 0 /LPF
DIFFERENTIAL METHOD BLD: ABNORMAL
EOSINOPHIL # BLD: 0 K/UL (ref 0–0.8)
EOSINOPHIL NFR BLD: 0 % (ref 0.5–7.8)
EPI CELLS #/AREA URNS HPF: ABNORMAL /HPF
ERYTHROCYTE [DISTWIDTH] IN BLOOD BY AUTOMATED COUNT: 13.3 % (ref 11.9–14.6)
EST. AVERAGE GLUCOSE BLD GHB EST-MCNC: 146 MG/DL
GLOBULIN SER CALC-MCNC: 3 G/DL (ref 2.8–4.5)
GLUCOSE BLD STRIP.AUTO-MCNC: 149 MG/DL (ref 65–100)
GLUCOSE SERPL-MCNC: 250 MG/DL (ref 65–100)
GLUCOSE UR STRIP.AUTO-MCNC: 250 MG/DL
HBA1C MFR BLD: 6.7 % (ref 4.8–5.6)
HCT VFR BLD AUTO: 42.5 % (ref 41.1–50.3)
HGB BLD-MCNC: 14.3 G/DL (ref 13.6–17.2)
HGB UR QL STRIP: ABNORMAL
IMM GRANULOCYTES # BLD AUTO: 0.1 K/UL (ref 0–0.5)
IMM GRANULOCYTES NFR BLD AUTO: 1 % (ref 0–5)
KETONES UR QL STRIP.AUTO: NEGATIVE MG/DL
LACTATE SERPL-SCNC: 1.8 MMOL/L (ref 0.4–2)
LEUKOCYTE ESTERASE UR QL STRIP.AUTO: ABNORMAL
LYMPHOCYTES # BLD: 1.5 K/UL (ref 0.5–4.6)
LYMPHOCYTES NFR BLD: 7 % (ref 13–44)
MCH RBC QN AUTO: 29.6 PG (ref 26.1–32.9)
MCHC RBC AUTO-ENTMCNC: 33.6 G/DL (ref 31.4–35)
MCV RBC AUTO: 88 FL (ref 82–102)
MONOCYTES # BLD: 2.4 K/UL (ref 0.1–1.3)
MONOCYTES NFR BLD: 12 % (ref 4–12)
MUCOUS THREADS URNS QL MICRO: 0 /LPF
NEUTS SEG # BLD: 15.6 K/UL (ref 1.7–8.2)
NEUTS SEG NFR BLD: 80 % (ref 43–78)
NITRITE UR QL STRIP.AUTO: NEGATIVE
NRBC # BLD: 0 K/UL (ref 0–0.2)
OTHER OBSERVATIONS: ABNORMAL
PH UR STRIP: 5.5 (ref 5–9)
PLATELET # BLD AUTO: 214 K/UL (ref 150–450)
PMV BLD AUTO: 10.1 FL (ref 9.4–12.3)
POTASSIUM SERPL-SCNC: 4.5 MMOL/L (ref 3.5–5.1)
PROCALCITONIN SERPL-MCNC: 1.26 NG/ML (ref 0–0.49)
PROT SERPL-MCNC: 6.8 G/DL (ref 6.4–8.2)
PROT UR STRIP-MCNC: 100 MG/DL
RBC # BLD AUTO: 4.83 M/UL (ref 4.23–5.6)
RBC #/AREA URNS HPF: ABNORMAL /HPF
SERVICE CMNT-IMP: ABNORMAL
SODIUM SERPL-SCNC: 135 MMOL/L (ref 133–143)
SP GR UR REFRACTOMETRY: 1.02 (ref 1–1.02)
UROBILINOGEN UR QL STRIP.AUTO: 0.2 EU/DL (ref 0.2–1)
WBC # BLD AUTO: 19.5 K/UL (ref 4.3–11.1)
WBC URNS QL MICRO: >100 /HPF

## 2024-01-28 PROCEDURE — 2580000003 HC RX 258

## 2024-01-28 PROCEDURE — 6360000002 HC RX W HCPCS

## 2024-01-28 PROCEDURE — 87040 BLOOD CULTURE FOR BACTERIA: CPT

## 2024-01-28 PROCEDURE — 96374 THER/PROPH/DIAG INJ IV PUSH: CPT

## 2024-01-28 PROCEDURE — 82962 GLUCOSE BLOOD TEST: CPT

## 2024-01-28 PROCEDURE — 74176 CT ABD & PELVIS W/O CONTRAST: CPT

## 2024-01-28 PROCEDURE — 71046 X-RAY EXAM CHEST 2 VIEWS: CPT

## 2024-01-28 PROCEDURE — 81001 URINALYSIS AUTO W/SCOPE: CPT

## 2024-01-28 PROCEDURE — 85025 COMPLETE CBC W/AUTO DIFF WBC: CPT

## 2024-01-28 PROCEDURE — 87086 URINE CULTURE/COLONY COUNT: CPT

## 2024-01-28 PROCEDURE — 87186 SC STD MICRODIL/AGAR DIL: CPT

## 2024-01-28 PROCEDURE — 1100000003 HC PRIVATE W/ TELEMETRY

## 2024-01-28 PROCEDURE — 83036 HEMOGLOBIN GLYCOSYLATED A1C: CPT

## 2024-01-28 PROCEDURE — 84145 PROCALCITONIN (PCT): CPT

## 2024-01-28 PROCEDURE — 6360000002 HC RX W HCPCS: Performed by: FAMILY MEDICINE

## 2024-01-28 PROCEDURE — 83605 ASSAY OF LACTIC ACID: CPT

## 2024-01-28 PROCEDURE — 36415 COLL VENOUS BLD VENIPUNCTURE: CPT

## 2024-01-28 PROCEDURE — 99285 EMERGENCY DEPT VISIT HI MDM: CPT

## 2024-01-28 PROCEDURE — 2580000003 HC RX 258: Performed by: FAMILY MEDICINE

## 2024-01-28 PROCEDURE — 87088 URINE BACTERIA CULTURE: CPT

## 2024-01-28 PROCEDURE — 80053 COMPREHEN METABOLIC PANEL: CPT

## 2024-01-28 RX ORDER — 0.9 % SODIUM CHLORIDE 0.9 %
1000 INTRAVENOUS SOLUTION INTRAVENOUS ONCE
Status: DISCONTINUED | OUTPATIENT
Start: 2024-01-28 | End: 2024-01-28

## 2024-01-28 RX ORDER — SODIUM CHLORIDE 0.9 % (FLUSH) 0.9 %
5-40 SYRINGE (ML) INJECTION PRN
Status: DISCONTINUED | OUTPATIENT
Start: 2024-01-28 | End: 2024-01-31 | Stop reason: HOSPADM

## 2024-01-28 RX ORDER — HEPARIN SODIUM 5000 [USP'U]/ML
5000 INJECTION, SOLUTION INTRAVENOUS; SUBCUTANEOUS EVERY 8 HOURS SCHEDULED
Status: DISCONTINUED | OUTPATIENT
Start: 2024-01-28 | End: 2024-01-31 | Stop reason: HOSPADM

## 2024-01-28 RX ORDER — SODIUM CHLORIDE 9 MG/ML
INJECTION, SOLUTION INTRAVENOUS PRN
Status: DISCONTINUED | OUTPATIENT
Start: 2024-01-28 | End: 2024-01-31 | Stop reason: HOSPADM

## 2024-01-28 RX ORDER — ONDANSETRON 2 MG/ML
4 INJECTION INTRAMUSCULAR; INTRAVENOUS EVERY 6 HOURS PRN
Status: DISCONTINUED | OUTPATIENT
Start: 2024-01-28 | End: 2024-01-31 | Stop reason: HOSPADM

## 2024-01-28 RX ORDER — IPRATROPIUM BROMIDE 21 UG/1
2 SPRAY, METERED NASAL EVERY 12 HOURS
Status: DISCONTINUED | OUTPATIENT
Start: 2024-01-28 | End: 2024-01-31 | Stop reason: HOSPADM

## 2024-01-28 RX ORDER — DEXTROSE MONOHYDRATE 100 MG/ML
INJECTION, SOLUTION INTRAVENOUS CONTINUOUS PRN
Status: DISCONTINUED | OUTPATIENT
Start: 2024-01-28 | End: 2024-01-31 | Stop reason: HOSPADM

## 2024-01-28 RX ORDER — ACETAMINOPHEN 650 MG/1
650 SUPPOSITORY RECTAL EVERY 6 HOURS PRN
Status: DISCONTINUED | OUTPATIENT
Start: 2024-01-28 | End: 2024-01-31 | Stop reason: HOSPADM

## 2024-01-28 RX ORDER — 0.9 % SODIUM CHLORIDE 0.9 %
1000 INTRAVENOUS SOLUTION INTRAVENOUS
Status: COMPLETED | OUTPATIENT
Start: 2024-01-28 | End: 2024-01-28

## 2024-01-28 RX ORDER — INSULIN LISPRO 100 [IU]/ML
0-4 INJECTION, SOLUTION INTRAVENOUS; SUBCUTANEOUS
Status: DISCONTINUED | OUTPATIENT
Start: 2024-01-28 | End: 2024-01-31 | Stop reason: HOSPADM

## 2024-01-28 RX ORDER — SODIUM CHLORIDE 9 MG/ML
INJECTION, SOLUTION INTRAVENOUS CONTINUOUS
Status: ACTIVE | OUTPATIENT
Start: 2024-01-28 | End: 2024-01-30

## 2024-01-28 RX ORDER — ONDANSETRON 4 MG/1
4 TABLET, ORALLY DISINTEGRATING ORAL EVERY 8 HOURS PRN
Status: DISCONTINUED | OUTPATIENT
Start: 2024-01-28 | End: 2024-01-31 | Stop reason: HOSPADM

## 2024-01-28 RX ORDER — FLUTICASONE PROPIONATE 50 MCG
1 SPRAY, SUSPENSION (ML) NASAL DAILY
Status: DISCONTINUED | OUTPATIENT
Start: 2024-01-28 | End: 2024-01-31 | Stop reason: HOSPADM

## 2024-01-28 RX ORDER — IRBESARTAN 150 MG/1
150 TABLET ORAL DAILY
Status: ON HOLD | COMMUNITY
Start: 2023-12-12 | End: 2024-01-31 | Stop reason: HOSPADM

## 2024-01-28 RX ORDER — POLYETHYLENE GLYCOL 3350 17 G/17G
17 POWDER, FOR SOLUTION ORAL DAILY PRN
Status: DISCONTINUED | OUTPATIENT
Start: 2024-01-28 | End: 2024-01-31 | Stop reason: HOSPADM

## 2024-01-28 RX ORDER — SODIUM CHLORIDE 0.9 % (FLUSH) 0.9 %
5-40 SYRINGE (ML) INJECTION EVERY 12 HOURS SCHEDULED
Status: DISCONTINUED | OUTPATIENT
Start: 2024-01-28 | End: 2024-01-31 | Stop reason: HOSPADM

## 2024-01-28 RX ORDER — LEVOTHYROXINE SODIUM 112 UG/1
112 TABLET ORAL DAILY
Status: DISCONTINUED | OUTPATIENT
Start: 2024-01-28 | End: 2024-01-31 | Stop reason: HOSPADM

## 2024-01-28 RX ORDER — TAMSULOSIN HYDROCHLORIDE 0.4 MG/1
0.4 CAPSULE ORAL DAILY
Status: DISCONTINUED | OUTPATIENT
Start: 2024-01-28 | End: 2024-01-31 | Stop reason: HOSPADM

## 2024-01-28 RX ORDER — ACETAMINOPHEN 325 MG/1
650 TABLET ORAL EVERY 6 HOURS PRN
Status: DISCONTINUED | OUTPATIENT
Start: 2024-01-28 | End: 2024-01-31 | Stop reason: HOSPADM

## 2024-01-28 RX ORDER — FINASTERIDE 5 MG/1
5 TABLET, FILM COATED ORAL DAILY
Status: DISCONTINUED | OUTPATIENT
Start: 2024-01-28 | End: 2024-01-31 | Stop reason: HOSPADM

## 2024-01-28 RX ORDER — LOSARTAN POTASSIUM 50 MG/1
50 TABLET ORAL DAILY
Status: DISCONTINUED | OUTPATIENT
Start: 2024-01-28 | End: 2024-01-31 | Stop reason: HOSPADM

## 2024-01-28 RX ORDER — INSULIN LISPRO 100 [IU]/ML
0-4 INJECTION, SOLUTION INTRAVENOUS; SUBCUTANEOUS NIGHTLY
Status: DISCONTINUED | OUTPATIENT
Start: 2024-01-28 | End: 2024-01-31 | Stop reason: HOSPADM

## 2024-01-28 RX ADMIN — SODIUM CHLORIDE, PRESERVATIVE FREE 10 ML: 5 INJECTION INTRAVENOUS at 21:06

## 2024-01-28 RX ADMIN — VANCOMYCIN HYDROCHLORIDE 1750 MG: 10 INJECTION, POWDER, LYOPHILIZED, FOR SOLUTION INTRAVENOUS at 21:43

## 2024-01-28 RX ADMIN — WATER 1000 MG: 1 INJECTION INTRAMUSCULAR; INTRAVENOUS; SUBCUTANEOUS at 13:35

## 2024-01-28 RX ADMIN — HEPARIN SODIUM 5000 UNITS: 5000 INJECTION INTRAVENOUS; SUBCUTANEOUS at 21:58

## 2024-01-28 RX ADMIN — SODIUM CHLORIDE 1000 ML: 9 INJECTION, SOLUTION INTRAVENOUS at 12:42

## 2024-01-28 ASSESSMENT — PAIN - FUNCTIONAL ASSESSMENT: PAIN_FUNCTIONAL_ASSESSMENT: NONE - DENIES PAIN

## 2024-01-28 NOTE — H&P
SSI    SARAVANAN  Continue CPAP nightly    Hypothyroidism  Follows endocrinology Dr. Abreu outpatient  Continue home Synthroid    HTN  Follows [facility] outpatient  Continue home Norvasc, ARB    BPH  Continue home finasteride, Flomax    PT/OT evals and PPD ordered?  Therapy and PPD  Diet: No diet orders on file  VTE prophylaxis: Heparin  Code status: No Order      Non-peripheral Lines and Tubes (if present):             Hospital Problems:  Principal Problem:    Complicated UTI (urinary tract infection)  Resolved Problems:    * No resolved hospital problems. *      Past History:     Past Medical History:   Diagnosis Date    BPH (benign prostatic hyperplasia)     Chronic bacterial prostatitis     Chronic kidney disease (CKD), stage III (moderate) (HCC)     Deviated nasal septum     Hearing loss sensory, bilateral     Hypertension     Hypertrophy of nasal turbinates     SARAVANAN (obstructive sleep apnea)     Subdural hematoma (HCC)     Type 2 diabetes mellitus (HCC)     no medicaitons    Ureteral stone     Vestibular disequilibrium involving both inner ears        Past Surgical History:   Procedure Laterality Date    CATARACT EXTRACTION      COLONOSCOPY      CYSTOSCOPY Left 2022    CYSTOSCOPY,LEFT URETEROSCOPY, LASER LITHOTRIPSY, LEFT URETERAL STENT EXCHANGE performed by Bernabe Neal MD at [facility] MAIN OR    CYSTOURETHROSCOPY  10/29/2022    ELBOW SURGERY          Social History     Tobacco Use    Smoking status: Former     Types: Pipe     Quit date:      Years since quittin.0    Smokeless tobacco: Never   Substance Use Topics    Alcohol use: Yes     Comment: occasional      Social History     Substance and Sexual Activity   Drug Use Not Currently       Family History   Problem Relation Age of Onset    Thyroid Disease Neg Hx         Immunization History   Administered Date(s) Administered    COVID-19, PFIZER PURPLE top, DILUTE for use, (age 12 y+), 30mcg/0.3mL 2021, 2021, 2021

## 2024-01-28 NOTE — ED TRIAGE NOTES
Pt ambulatory to triage. Pt reports urinary frequency with odor that started in the last 24 hours. Pt denies v/d. Pt states he had the flu over a week ago

## 2024-01-28 NOTE — ED NOTES
Attempted to call report at this time. RN unavailable to take report due to being in patient room.    Eucrisa Pregnancy And Lactation Text: This medication has not been assigned a Pregnancy Risk Category but animal studies failed to show danger with the topical medication. It is unknown if the medication is excreted in breast milk.

## 2024-01-28 NOTE — ED PROVIDER NOTES
Emergency Department Provider Note       PCP: Bernabe Lawler MD   Age: 88 y.o.   Sex: male     DISPOSITION Decision To Transfer 01/28/2024 02:35:02 PM       ICD-10-CM    1. Sepsis, due to unspecified organism, unspecified whether acute organ dysfunction present (HCC)  A41.9       2. Cystitis with hematuria  N30.91       3. Hydroureter, left  N13.4       4. Left ureteral stone  N20.1     Suspected at [facility]          Medical Decision Making     Complexity of Problems Addressed:  1 or more acute illnesses that pose a threat to life or bodily function.     Data Reviewed and Analyzed:   I independently ordered and reviewed each unique test.  I reviewed external records: provider visit note from PCP.  I reviewed external records: provider visit note from outside specialist.  I reviewed external records: previous lab results from outside ED.  I reviewed external records: previous imaging study including radiologist interpretation.   Reviewed notes from urology visit 5/22/2023; reviewed notes from primary care visit on 1/17/2024; reviewed prior lab work; reviewed prior            I interpreted the X-rays possible trace left pleural effusion and left lung base airspace disease on chest x-ray.  No pneumothorax.  In agreement with radiologist to retake.  I interpreted the CT Scan CT abdomen pelvis without contrast demonstrates a 6 mm calcification just inferior to the distal left ureter may be a phlebolith distal ureteral calculus is not entirely excluded but felt less likely per the radiologist; there is mild to moderate left hydroureter.  Kidneys are otherwise unremarkable.  There is some diffuse bladder wall thickening.  Right posterior bladder diverticulum.  I am in agreement with radiologist interpreted.  I do have a high suspicion that this may be a ureteral stone due to hydronephrosis patient's history of ureteral lithiasis    Discussion of management or test interpretation.  Vital signs reviewed, patient stable,

## 2024-01-28 NOTE — ED NOTES
TRANSFER - OUT REPORT:    Verbal report given to guido Chaudhari RN on Schuyler Robins  being transferred to  for routine progression of patient care       Report consisted of patient's Situation, Background, Assessment and   Recommendations(SBAR).     Information from the following report(s) Nurse Handoff Report was reviewed with the receiving nurse.    [place] Fall Assessment:    Presents to emergency department  because of falls (Syncope, seizure, or loss of consciousness): No  Age > 70: Yes  Altered Mental Status, Intoxication with alcohol or substance confusion (Disorientation, impaired judgment, poor safety awaremess, or inability to follow instructions): No  Impaired Mobility: Ambulates or transfers with assistive devices or assistance; Unable to ambulate or transer.: No  Nursing Judgement: No          Lines:   Peripheral IV 01/28/24 Left Antecubital (Active)       Peripheral IV 01/28/24 Right;Ventral Forearm (Active)   Site Assessment Clean, dry & intact 01/28/24 1330   Line Status Blood return noted;Brisk blood return 01/28/24 1330   Line Care Line pulled back 01/28/24 1330   Phlebitis Assessment No symptoms 01/28/24 1330   Infiltration Assessment 0 01/28/24 1330   Alcohol Cap Used No 01/28/24 1330   Dressing Status New dressing applied;Clean, dry & intact 01/28/24 1330   Dressing Type Transparent 01/28/24 1330   Dressing Intervention New 01/28/24 1330        Opportunity for questions and clarification was provided.      Patient transported with:  Monitor

## 2024-01-29 PROBLEM — N13.2 HYDRONEPHROSIS WITH URINARY OBSTRUCTION DUE TO URETERAL CALCULUS: Status: ACTIVE | Noted: 2022-10-28

## 2024-01-29 PROBLEM — N13.2 HYDRONEPHROSIS WITH URINARY OBSTRUCTION DUE TO URETERAL CALCULUS: Status: RESOLVED | Noted: 2022-10-28 | Resolved: 2024-01-29

## 2024-01-29 LAB
ANION GAP SERPL CALC-SCNC: 6 MMOL/L (ref 2–11)
BASOPHILS # BLD: 0 K/UL (ref 0–0.2)
BASOPHILS NFR BLD: 0 % (ref 0–2)
BUN SERPL-MCNC: 41 MG/DL (ref 8–23)
CALCIUM SERPL-MCNC: 8.1 MG/DL (ref 8.3–10.4)
CHLORIDE SERPL-SCNC: 112 MMOL/L (ref 103–113)
CO2 SERPL-SCNC: 21 MMOL/L (ref 21–32)
CREAT SERPL-MCNC: 2.5 MG/DL (ref 0.8–1.5)
DIFFERENTIAL METHOD BLD: ABNORMAL
EOSINOPHIL # BLD: 0 K/UL (ref 0–0.8)
EOSINOPHIL NFR BLD: 0 % (ref 0.5–7.8)
ERYTHROCYTE [DISTWIDTH] IN BLOOD BY AUTOMATED COUNT: 13.6 % (ref 11.9–14.6)
GLUCOSE BLD STRIP.AUTO-MCNC: 133 MG/DL (ref 65–100)
GLUCOSE BLD STRIP.AUTO-MCNC: 140 MG/DL (ref 65–100)
GLUCOSE BLD STRIP.AUTO-MCNC: 145 MG/DL (ref 65–100)
GLUCOSE BLD STRIP.AUTO-MCNC: 148 MG/DL (ref 65–100)
GLUCOSE SERPL-MCNC: 150 MG/DL (ref 65–100)
HCT VFR BLD AUTO: 39 % (ref 41.1–50.3)
HGB BLD-MCNC: 12.6 G/DL (ref 13.6–17.2)
IMM GRANULOCYTES # BLD AUTO: 0.1 K/UL (ref 0–0.5)
IMM GRANULOCYTES NFR BLD AUTO: 1 % (ref 0–5)
LYMPHOCYTES # BLD: 1.4 K/UL (ref 0.5–4.6)
LYMPHOCYTES NFR BLD: 9 % (ref 13–44)
MCH RBC QN AUTO: 29.2 PG (ref 26.1–32.9)
MCHC RBC AUTO-ENTMCNC: 32.3 G/DL (ref 31.4–35)
MCV RBC AUTO: 90.5 FL (ref 82–102)
MONOCYTES # BLD: 1.6 K/UL (ref 0.1–1.3)
MONOCYTES NFR BLD: 10 % (ref 4–12)
NEUTS SEG # BLD: 12.3 K/UL (ref 1.7–8.2)
NEUTS SEG NFR BLD: 80 % (ref 43–78)
NRBC # BLD: 0 K/UL (ref 0–0.2)
PLATELET # BLD AUTO: 188 K/UL (ref 150–450)
PMV BLD AUTO: 10.6 FL (ref 9.4–12.3)
POTASSIUM SERPL-SCNC: 4 MMOL/L (ref 3.5–5.1)
RBC # BLD AUTO: 4.31 M/UL (ref 4.23–5.6)
SERVICE CMNT-IMP: ABNORMAL
SODIUM SERPL-SCNC: 139 MMOL/L (ref 136–146)
WBC # BLD AUTO: 15.4 K/UL (ref 4.3–11.1)

## 2024-01-29 PROCEDURE — 6370000000 HC RX 637 (ALT 250 FOR IP): Performed by: INTERNAL MEDICINE

## 2024-01-29 PROCEDURE — 97112 NEUROMUSCULAR REEDUCATION: CPT

## 2024-01-29 PROCEDURE — 82962 GLUCOSE BLOOD TEST: CPT

## 2024-01-29 PROCEDURE — 80048 BASIC METABOLIC PNL TOTAL CA: CPT

## 2024-01-29 PROCEDURE — 1100000003 HC PRIVATE W/ TELEMETRY

## 2024-01-29 PROCEDURE — 97165 OT EVAL LOW COMPLEX 30 MIN: CPT

## 2024-01-29 PROCEDURE — 2500000003 HC RX 250 WO HCPCS: Performed by: FAMILY MEDICINE

## 2024-01-29 PROCEDURE — 97535 SELF CARE MNGMENT TRAINING: CPT

## 2024-01-29 PROCEDURE — 99222 1ST HOSP IP/OBS MODERATE 55: CPT | Performed by: UROLOGY

## 2024-01-29 PROCEDURE — 6370000000 HC RX 637 (ALT 250 FOR IP): Performed by: FAMILY MEDICINE

## 2024-01-29 PROCEDURE — 97161 PT EVAL LOW COMPLEX 20 MIN: CPT

## 2024-01-29 PROCEDURE — 6360000002 HC RX W HCPCS: Performed by: INTERNAL MEDICINE

## 2024-01-29 PROCEDURE — 2580000003 HC RX 258: Performed by: INTERNAL MEDICINE

## 2024-01-29 PROCEDURE — 36415 COLL VENOUS BLD VENIPUNCTURE: CPT

## 2024-01-29 PROCEDURE — 97530 THERAPEUTIC ACTIVITIES: CPT

## 2024-01-29 PROCEDURE — 85025 COMPLETE CBC W/AUTO DIFF WBC: CPT

## 2024-01-29 PROCEDURE — 6360000002 HC RX W HCPCS: Performed by: FAMILY MEDICINE

## 2024-01-29 PROCEDURE — 2580000003 HC RX 258: Performed by: FAMILY MEDICINE

## 2024-01-29 RX ORDER — HYDROCODONE BITARTRATE AND ACETAMINOPHEN 5; 325 MG/1; MG/1
1 TABLET ORAL EVERY 6 HOURS PRN
Status: DISCONTINUED | OUTPATIENT
Start: 2024-01-29 | End: 2024-01-31 | Stop reason: HOSPADM

## 2024-01-29 RX ORDER — HYDROCODONE BITARTRATE AND ACETAMINOPHEN 5; 325 MG/1; MG/1
1 TABLET ORAL ONCE
Status: COMPLETED | OUTPATIENT
Start: 2024-01-29 | End: 2024-01-29

## 2024-01-29 RX ADMIN — LOSARTAN POTASSIUM 50 MG: 50 TABLET, FILM COATED ORAL at 10:35

## 2024-01-29 RX ADMIN — TAMSULOSIN HYDROCHLORIDE 0.4 MG: 0.4 CAPSULE ORAL at 10:35

## 2024-01-29 RX ADMIN — SODIUM CHLORIDE: 9 INJECTION, SOLUTION INTRAVENOUS at 12:37

## 2024-01-29 RX ADMIN — FLUTICASONE PROPIONATE 1 SPRAY: 50 SPRAY, METERED NASAL at 00:06

## 2024-01-29 RX ADMIN — HEPARIN SODIUM 5000 UNITS: 5000 INJECTION INTRAVENOUS; SUBCUTANEOUS at 14:43

## 2024-01-29 RX ADMIN — HEPARIN SODIUM 5000 UNITS: 5000 INJECTION INTRAVENOUS; SUBCUTANEOUS at 21:41

## 2024-01-29 RX ADMIN — SODIUM CHLORIDE: 9 INJECTION, SOLUTION INTRAVENOUS at 00:30

## 2024-01-29 RX ADMIN — HYDROCODONE BITARTRATE AND ACETAMINOPHEN 1 TABLET: 5; 325 TABLET ORAL at 12:34

## 2024-01-29 RX ADMIN — VANCOMYCIN HYDROCHLORIDE 500 MG: 500 INJECTION, POWDER, LYOPHILIZED, FOR SOLUTION INTRAVENOUS at 21:40

## 2024-01-29 RX ADMIN — FLUTICASONE PROPIONATE 1 SPRAY: 50 SPRAY, METERED NASAL at 10:36

## 2024-01-29 RX ADMIN — WATER 1000 MG: 1 INJECTION INTRAMUSCULAR; INTRAVENOUS; SUBCUTANEOUS at 14:43

## 2024-01-29 RX ADMIN — LEVOTHYROXINE SODIUM 112 MCG: 0.11 TABLET ORAL at 10:35

## 2024-01-29 RX ADMIN — FINASTERIDE 5 MG: 5 TABLET, FILM COATED ORAL at 10:35

## 2024-01-29 RX ADMIN — HYDROCODONE BITARTRATE AND ACETAMINOPHEN 1 TABLET: 5; 325 TABLET ORAL at 04:25

## 2024-01-29 RX ADMIN — TUBERCULIN PURIFIED PROTEIN DERIVATIVE 5 UNITS: 5 INJECTION, SOLUTION INTRADERMAL at 06:01

## 2024-01-29 RX ADMIN — SODIUM CHLORIDE, PRESERVATIVE FREE 10 ML: 5 INJECTION INTRAVENOUS at 21:41

## 2024-01-29 RX ADMIN — SODIUM CHLORIDE, PRESERVATIVE FREE 10 ML: 5 INJECTION INTRAVENOUS at 10:39

## 2024-01-29 ASSESSMENT — PAIN - FUNCTIONAL ASSESSMENT: PAIN_FUNCTIONAL_ASSESSMENT: PREVENTS OR INTERFERES SOME ACTIVE ACTIVITIES AND ADLS

## 2024-01-29 ASSESSMENT — PAIN DESCRIPTION - ORIENTATION: ORIENTATION: INNER

## 2024-01-29 ASSESSMENT — PAIN DESCRIPTION - DESCRIPTORS
DESCRIPTORS: DISCOMFORT
DESCRIPTORS: ACHING

## 2024-01-29 ASSESSMENT — PAIN DESCRIPTION - LOCATION
LOCATION: PENIS
LOCATION: PENIS

## 2024-01-29 ASSESSMENT — PAIN SCALES - GENERAL
PAINLEVEL_OUTOF10: 0
PAINLEVEL_OUTOF10: 6
PAINLEVEL_OUTOF10: 7

## 2024-01-29 NOTE — CONSULTS
[facility]  [address] [place] [address]  [phone]    Schuyler Robins  : 1935     HPI   88 y.o., male seen in consultation for UTI.  Pt reports increased freq and foul smelling urine over the past several days.  He remains on Flomax and Proscar.  Prior urolift and stones.  Followed by Dr. Neal.  CT yesterday shows mild L hydro.  This was personally reviewed which shows mild L hydro vs extrarenal pelvis with a pelvic opacification that does not appear to be in the vicinity of the L distal ureter (likely phebolith).  Denies fevers or flank pain.  Bladder shows mod distension with several diverticula.      Past Medical History:   Diagnosis Date    BPH (benign prostatic hyperplasia)     Chronic bacterial prostatitis     Chronic kidney disease (CKD), stage III (moderate) (HCC)     Deviated nasal septum     Hearing loss sensory, bilateral     Hypertension     Hypertrophy of nasal turbinates     SARAVANAN (obstructive sleep apnea)     Subdural hematoma (HCC)     Type 2 diabetes mellitus (HCC)     no medicaitons    Ureteral stone     Vestibular disequilibrium involving both inner ears      Past Surgical History:   Procedure Laterality Date    CATARACT EXTRACTION  2016    COLONOSCOPY      CYSTOSCOPY Left 2022    CYSTOSCOPY,LEFT URETEROSCOPY, LASER LITHOTRIPSY, LEFT URETERAL STENT EXCHANGE performed by Bernabe Neal MD at [facility] MAIN OR    CYSTOURETHROSCOPY  10/29/2022    ELBOW SURGERY       Current Facility-Administered Medications   Medication Dose Route Frequency Provider Last Rate Last Admin    finasteride (PROSCAR) tablet 5 mg  5 mg Oral Daily Tellez, Thu, DO   5 mg at 24 1035    fluticasone (FLONASE) 50 MCG/ACT nasal spray 1 spray  1 spray Each Nostril Daily Tellez, Thu, DO   1 spray at 24 1036    ipratropium (ATROVENT) 0.03 % nasal spray 2 spray  2 spray Each Nostril Q12H Tellez, Thu, DO        losartan (COZAAR) tablet 50 mg  50 mg Oral Daily Tellez, Thu, DO   50

## 2024-01-29 NOTE — CARE COORDINATION
CM screened chart for potential discharge needs; no CM consult received.  Patient admitted with hydronephrosis of left kidney.  Urology consulted and documents, \"UTI with mild L hydro vs extrarenal pelvis. No evidence of ureteral stone by my review of UTI. Cont Vanc and Rocephin. Await final cultures. No plan for intervention. Cont Flomax and Proscar. Reviewed TURP option again. He will follow up with Dr. Neal in the next couple wks (we will arrange).\"  Patient is insured with managed Medicare plan and is current with PCP.  CM does not anticipate any needs at discharge.  Please consult for any noted needs at discharge.         01/29/24 9565   Service Assessment   Patient Orientation Alert and Oriented   Cognition Alert   History Provided By Medical Record   Primary Caregiver Self   Accompanied By/Relationship N/A   Support Systems Spouse/Significant Other   Patient's Healthcare Decision Maker is: Legal Next of Kin   PCP Verified by CM Yes   Last Visit to PCP Within last 3 months  (1/17/2024)   Prior Functional Level Independent in ADLs/IADLs   Current Functional Level Other (see comment)  (Pending therapy evaluations/recommendations)   Can patient return to prior living arrangement Unknown at present   Ability to make needs known: Good   Family able to assist with home care needs: Yes   Would you like for me to discuss the discharge plan with any other family members/significant others, and if so, who? No   Financial Resources Medicare   Community Resources None   CM/SW Referral Other (see comment)  (No CM consult)

## 2024-01-29 NOTE — ACP (ADVANCE CARE PLANNING)
[employer] [facility]  At the heart of better care     Advance Care Planning   Admit Date:  2024  4:58 PM   Name:  Schuyler Robins   Age:  88 y.o.  Sex:  male  :  1935   MRN:  [id number]   Room:  [facility]/    Schuyler Robins has capacity to make his own decisions:   Yes    If pt unable to make decisions, POA/surrogate decision maker:  Spouse Raeann Robins  Son Smiley Robins    Pt was alert and oriented x3 on admission. Discussed code status in detail and pt desires full code at this time.     Patient or surrogate consented to discussion of the current conditions, workup, management plans, prognosis, and the risk for further deterioration.  Time spent: 17 minutes in direct discussion.      Signed:  Ani Tellez DO

## 2024-01-30 ENCOUNTER — TELEPHONE (OUTPATIENT)
Dept: UROLOGY | Age: 89
End: 2024-01-30

## 2024-01-30 LAB
ANION GAP SERPL CALC-SCNC: 5 MMOL/L (ref 2–11)
BASOPHILS # BLD: 0 K/UL (ref 0–0.2)
BASOPHILS NFR BLD: 0 % (ref 0–2)
BUN SERPL-MCNC: 44 MG/DL (ref 8–23)
CALCIUM SERPL-MCNC: 7.9 MG/DL (ref 8.3–10.4)
CHLORIDE SERPL-SCNC: 113 MMOL/L (ref 103–113)
CO2 SERPL-SCNC: 22 MMOL/L (ref 21–32)
CREAT SERPL-MCNC: 2.5 MG/DL (ref 0.8–1.5)
DIFFERENTIAL METHOD BLD: ABNORMAL
EOSINOPHIL # BLD: 0.1 K/UL (ref 0–0.8)
EOSINOPHIL NFR BLD: 1 % (ref 0.5–7.8)
ERYTHROCYTE [DISTWIDTH] IN BLOOD BY AUTOMATED COUNT: 13.8 % (ref 11.9–14.6)
GLUCOSE BLD STRIP.AUTO-MCNC: 146 MG/DL (ref 65–100)
GLUCOSE BLD STRIP.AUTO-MCNC: 156 MG/DL (ref 65–100)
GLUCOSE BLD STRIP.AUTO-MCNC: 180 MG/DL (ref 65–100)
GLUCOSE BLD STRIP.AUTO-MCNC: 211 MG/DL (ref 65–100)
GLUCOSE SERPL-MCNC: 158 MG/DL (ref 65–100)
HCT VFR BLD AUTO: 36.6 % (ref 41.1–50.3)
HGB BLD-MCNC: 12.1 G/DL (ref 13.6–17.2)
IMM GRANULOCYTES # BLD AUTO: 0 K/UL (ref 0–0.5)
IMM GRANULOCYTES NFR BLD AUTO: 0 % (ref 0–5)
LYMPHOCYTES # BLD: 1.3 K/UL (ref 0.5–4.6)
LYMPHOCYTES NFR BLD: 13 % (ref 13–44)
MCH RBC QN AUTO: 29.8 PG (ref 26.1–32.9)
MCHC RBC AUTO-ENTMCNC: 33.1 G/DL (ref 31.4–35)
MCV RBC AUTO: 90.1 FL (ref 82–102)
MM INDURATION, POC: 0 MM (ref 0–5)
MONOCYTES # BLD: 0.9 K/UL (ref 0.1–1.3)
MONOCYTES NFR BLD: 10 % (ref 4–12)
NEUTS SEG # BLD: 7.2 K/UL (ref 1.7–8.2)
NEUTS SEG NFR BLD: 76 % (ref 43–78)
NRBC # BLD: 0 K/UL (ref 0–0.2)
PLATELET # BLD AUTO: 183 K/UL (ref 150–450)
PMV BLD AUTO: 10.7 FL (ref 9.4–12.3)
POTASSIUM SERPL-SCNC: 4.1 MMOL/L (ref 3.5–5.1)
PPD, POC: NEGATIVE
RBC # BLD AUTO: 4.06 M/UL (ref 4.23–5.6)
SERVICE CMNT-IMP: ABNORMAL
SODIUM SERPL-SCNC: 140 MMOL/L (ref 136–146)
VANCOMYCIN SERPL-MCNC: 14.7 UG/ML
WBC # BLD AUTO: 9.5 K/UL (ref 4.3–11.1)

## 2024-01-30 PROCEDURE — 36415 COLL VENOUS BLD VENIPUNCTURE: CPT

## 2024-01-30 PROCEDURE — 6360000002 HC RX W HCPCS: Performed by: FAMILY MEDICINE

## 2024-01-30 PROCEDURE — 6370000000 HC RX 637 (ALT 250 FOR IP): Performed by: INTERNAL MEDICINE

## 2024-01-30 PROCEDURE — 6370000000 HC RX 637 (ALT 250 FOR IP): Performed by: FAMILY MEDICINE

## 2024-01-30 PROCEDURE — 85025 COMPLETE CBC W/AUTO DIFF WBC: CPT

## 2024-01-30 PROCEDURE — 82962 GLUCOSE BLOOD TEST: CPT

## 2024-01-30 PROCEDURE — 2580000003 HC RX 258: Performed by: FAMILY MEDICINE

## 2024-01-30 PROCEDURE — 80202 ASSAY OF VANCOMYCIN: CPT

## 2024-01-30 PROCEDURE — 80048 BASIC METABOLIC PNL TOTAL CA: CPT

## 2024-01-30 PROCEDURE — 1100000003 HC PRIVATE W/ TELEMETRY

## 2024-01-30 RX ADMIN — HEPARIN SODIUM 5000 UNITS: 5000 INJECTION INTRAVENOUS; SUBCUTANEOUS at 06:36

## 2024-01-30 RX ADMIN — HEPARIN SODIUM 5000 UNITS: 5000 INJECTION INTRAVENOUS; SUBCUTANEOUS at 14:11

## 2024-01-30 RX ADMIN — HYDROCODONE BITARTRATE AND ACETAMINOPHEN 1 TABLET: 5; 325 TABLET ORAL at 17:55

## 2024-01-30 RX ADMIN — SODIUM CHLORIDE: 9 INJECTION, SOLUTION INTRAVENOUS at 01:44

## 2024-01-30 RX ADMIN — LOSARTAN POTASSIUM 50 MG: 50 TABLET, FILM COATED ORAL at 09:24

## 2024-01-30 RX ADMIN — HEPARIN SODIUM 5000 UNITS: 5000 INJECTION INTRAVENOUS; SUBCUTANEOUS at 22:21

## 2024-01-30 RX ADMIN — FINASTERIDE 5 MG: 5 TABLET, FILM COATED ORAL at 09:24

## 2024-01-30 RX ADMIN — FLUTICASONE PROPIONATE 1 SPRAY: 50 SPRAY, METERED NASAL at 09:24

## 2024-01-30 RX ADMIN — LEVOTHYROXINE SODIUM 112 MCG: 0.11 TABLET ORAL at 06:36

## 2024-01-30 RX ADMIN — HYDROCODONE BITARTRATE AND ACETAMINOPHEN 1 TABLET: 5; 325 TABLET ORAL at 09:23

## 2024-01-30 RX ADMIN — SODIUM CHLORIDE, PRESERVATIVE FREE 10 ML: 5 INJECTION INTRAVENOUS at 21:42

## 2024-01-30 RX ADMIN — WATER 1000 MG: 1 INJECTION INTRAMUSCULAR; INTRAVENOUS; SUBCUTANEOUS at 14:05

## 2024-01-30 RX ADMIN — SODIUM CHLORIDE, PRESERVATIVE FREE 10 ML: 5 INJECTION INTRAVENOUS at 09:24

## 2024-01-30 RX ADMIN — TAMSULOSIN HYDROCHLORIDE 0.4 MG: 0.4 CAPSULE ORAL at 09:23

## 2024-01-30 ASSESSMENT — PAIN DESCRIPTION - DESCRIPTORS
DESCRIPTORS: BURNING;DISCOMFORT
DESCRIPTORS: BURNING

## 2024-01-30 ASSESSMENT — PAIN SCALES - GENERAL
PAINLEVEL_OUTOF10: 5
PAINLEVEL_OUTOF10: 0
PAINLEVEL_OUTOF10: 6

## 2024-01-30 ASSESSMENT — PAIN DESCRIPTION - LOCATION
LOCATION: PERINEUM;GROIN
LOCATION: PERINEUM

## 2024-01-31 ENCOUNTER — TELEPHONE (OUTPATIENT)
Dept: UROLOGY | Age: 89
End: 2024-01-31

## 2024-01-31 VITALS
SYSTOLIC BLOOD PRESSURE: 162 MMHG | WEIGHT: 198 LBS | HEART RATE: 52 BPM | TEMPERATURE: 97.5 F | RESPIRATION RATE: 16 BRPM | DIASTOLIC BLOOD PRESSURE: 83 MMHG | HEIGHT: 68 IN | BODY MASS INDEX: 30.01 KG/M2 | OXYGEN SATURATION: 97 %

## 2024-01-31 LAB
ANION GAP SERPL CALC-SCNC: 5 MMOL/L (ref 2–11)
BACTERIA SPEC CULT: ABNORMAL
BACTERIA SPEC CULT: ABNORMAL
BASOPHILS # BLD: 0 K/UL (ref 0–0.2)
BASOPHILS NFR BLD: 1 % (ref 0–2)
BUN SERPL-MCNC: 42 MG/DL (ref 8–23)
CALCIUM SERPL-MCNC: 8.4 MG/DL (ref 8.3–10.4)
CHLORIDE SERPL-SCNC: 114 MMOL/L (ref 103–113)
CO2 SERPL-SCNC: 20 MMOL/L (ref 21–32)
CREAT SERPL-MCNC: 2.4 MG/DL (ref 0.8–1.5)
DIFFERENTIAL METHOD BLD: ABNORMAL
EOSINOPHIL # BLD: 0.2 K/UL (ref 0–0.8)
EOSINOPHIL NFR BLD: 3 % (ref 0.5–7.8)
ERYTHROCYTE [DISTWIDTH] IN BLOOD BY AUTOMATED COUNT: 13.4 % (ref 11.9–14.6)
GLUCOSE BLD STRIP.AUTO-MCNC: 134 MG/DL (ref 65–100)
GLUCOSE BLD STRIP.AUTO-MCNC: 210 MG/DL (ref 65–100)
GLUCOSE SERPL-MCNC: 152 MG/DL (ref 65–100)
HCT VFR BLD AUTO: 37.1 % (ref 41.1–50.3)
HGB BLD-MCNC: 12.2 G/DL (ref 13.6–17.2)
IMM GRANULOCYTES # BLD AUTO: 0 K/UL (ref 0–0.5)
IMM GRANULOCYTES NFR BLD AUTO: 0 % (ref 0–5)
LYMPHOCYTES # BLD: 1.7 K/UL (ref 0.5–4.6)
LYMPHOCYTES NFR BLD: 29 % (ref 13–44)
MCH RBC QN AUTO: 29.1 PG (ref 26.1–32.9)
MCHC RBC AUTO-ENTMCNC: 32.9 G/DL (ref 31.4–35)
MCV RBC AUTO: 88.5 FL (ref 82–102)
MONOCYTES # BLD: 0.8 K/UL (ref 0.1–1.3)
MONOCYTES NFR BLD: 13 % (ref 4–12)
NEUTS SEG # BLD: 3.2 K/UL (ref 1.7–8.2)
NEUTS SEG NFR BLD: 54 % (ref 43–78)
NRBC # BLD: 0 K/UL (ref 0–0.2)
PLATELET # BLD AUTO: 221 K/UL (ref 150–450)
PMV BLD AUTO: 10.3 FL (ref 9.4–12.3)
POTASSIUM SERPL-SCNC: 3.8 MMOL/L (ref 3.5–5.1)
RBC # BLD AUTO: 4.19 M/UL (ref 4.23–5.6)
SERVICE CMNT-IMP: ABNORMAL
SODIUM SERPL-SCNC: 139 MMOL/L (ref 136–146)
WBC # BLD AUTO: 6 K/UL (ref 4.3–11.1)

## 2024-01-31 PROCEDURE — 85025 COMPLETE CBC W/AUTO DIFF WBC: CPT

## 2024-01-31 PROCEDURE — 6370000000 HC RX 637 (ALT 250 FOR IP): Performed by: FAMILY MEDICINE

## 2024-01-31 PROCEDURE — 97112 NEUROMUSCULAR REEDUCATION: CPT

## 2024-01-31 PROCEDURE — 80048 BASIC METABOLIC PNL TOTAL CA: CPT

## 2024-01-31 PROCEDURE — 97116 GAIT TRAINING THERAPY: CPT

## 2024-01-31 PROCEDURE — 6360000002 HC RX W HCPCS: Performed by: FAMILY MEDICINE

## 2024-01-31 PROCEDURE — 6360000002 HC RX W HCPCS: Performed by: INTERNAL MEDICINE

## 2024-01-31 PROCEDURE — 82962 GLUCOSE BLOOD TEST: CPT

## 2024-01-31 PROCEDURE — 36415 COLL VENOUS BLD VENIPUNCTURE: CPT

## 2024-01-31 PROCEDURE — 2580000003 HC RX 258: Performed by: INTERNAL MEDICINE

## 2024-01-31 PROCEDURE — 97530 THERAPEUTIC ACTIVITIES: CPT

## 2024-01-31 PROCEDURE — 2580000003 HC RX 258: Performed by: FAMILY MEDICINE

## 2024-01-31 RX ORDER — CEFPODOXIME PROXETIL 200 MG/1
200 TABLET, FILM COATED ORAL DAILY
Qty: 2 TABLET | Refills: 0 | Status: SHIPPED | OUTPATIENT
Start: 2024-02-01 | End: 2024-01-31

## 2024-01-31 RX ORDER — CEFPODOXIME PROXETIL 200 MG/1
200 TABLET, FILM COATED ORAL DAILY
Qty: 3 TABLET | Refills: 0 | Status: SHIPPED | OUTPATIENT
Start: 2024-02-01 | End: 2024-02-04

## 2024-01-31 RX ADMIN — FINASTERIDE 5 MG: 5 TABLET, FILM COATED ORAL at 09:20

## 2024-01-31 RX ADMIN — LEVOTHYROXINE SODIUM 112 MCG: 0.11 TABLET ORAL at 04:52

## 2024-01-31 RX ADMIN — SODIUM CHLORIDE, PRESERVATIVE FREE 10 ML: 5 INJECTION INTRAVENOUS at 09:25

## 2024-01-31 RX ADMIN — WATER 1000 MG: 1 INJECTION INTRAMUSCULAR; INTRAVENOUS; SUBCUTANEOUS at 13:55

## 2024-01-31 RX ADMIN — LOSARTAN POTASSIUM 50 MG: 50 TABLET, FILM COATED ORAL at 09:19

## 2024-01-31 RX ADMIN — TAMSULOSIN HYDROCHLORIDE 0.4 MG: 0.4 CAPSULE ORAL at 09:20

## 2024-01-31 RX ADMIN — HEPARIN SODIUM 5000 UNITS: 5000 INJECTION INTRAVENOUS; SUBCUTANEOUS at 05:50

## 2024-01-31 ASSESSMENT — PAIN SCALES - GENERAL: PAINLEVEL_OUTOF10: 0

## 2024-01-31 NOTE — PROGRESS NOTES
Hospitalist Progress Note   Admit Date:  2024  4:58 PM   Name:  Schuyler Robins   Age:  88 y.o.  Sex:  male  :  1935   MRN:  [id number]   Room:  [facility]/    Presenting/Chief Complaint: No chief complaint on file.     Reason(s) for Admission: Complicated UTI (urinary tract infection) [N39.0]     Hospital Course:   Please refer to the admission H&P for details of presentation.      In summary, Schuyler Robins is a 88 y.o. male with medical history significant for  SARAVANAN, CKD 4 DM2 who presented with several day duration of increased urinary frequency with malodorous urine.  Workup in the ED revealed urinary tract infection with WBC of 19.5.  CXR shows possible trace left pleural effusion and left lung base airspace disease could be atelectasis and/or pneumonia.  CTAP shows diffuse bladder wall thickening suspicious for cystitis; mild to moderate left hydroureter; 6mm calcification inferior to distal L ureter likely phlebolith; distal ureteral calculus not excluded; right posterior bladder diverticulum; bilateral inguinal hernias.        Subjective/24 hr Events (24) :  Patient is seen and examined at bedside.  No acute events reported overnight by nursing staff.  Patient laying in bed.  Continue to have pain with urination but has improved with pain medication.  Tolerating diet and having bowel movement.  Patient denies fever, chills, chest pains, shortness of breath, n/v, abdominal pain.          Assessment & Plan:     Acute complicated cystitis  BPH  CTAP shows diffuse bladder wall thickening suspicious for cystitis; mild to moderate left hydroureter; 6mm calcification inferior to distal L ureter likely phlebolith; distal ureteral calculus not excluded  24: Ucx with GNRs. Still with dysuria   - dc vanc  - continue with rocephin pending ID and sensitivity of UCx  - follow up BCx  - Urology appreciated.  Follows with  at outpt.   - continue with flomax and proscar    CKD 
  Physician Progress Note      PATIENT:               MARY LOU HORNER  CSN #:                  [id number]  :                       1935  ADMIT DATE:       2024 4:58 PM  DISCH DATE:  RESPONDING  PROVIDER #:        Vinnie Aceves DO          QUERY TEXT:    Pt admitted with pyonephroses. Pt noted to have elevated WBC, procal, lactate   > 1.0. If possible, please document in the progress notes and discharge   summary if you are evaluating and /or treating any of the following:    The medical record reflects the following:  Risk Factors: 88 YOM, BPH,  complicated cystitis, DM2, CKD4, Hx kidney stones  Clinical Indicators:  several day duration of increased urinary frequency with   malodorous urine.   WBC 19.5,  WBC 15.4 .  PCT 1.26. La 1.8   Urology notes: UTI with mild L hydro vs extrarenal pelvis.  No evidence   of ureteral stone by my review of UTI.  Reviewed TURP option again. BPH with   Luts  Treatment: Urology consult, Flomax, Proscar,    Thank you,  Shell Osullivan RN,C BSN  Clinical   [email or web address]  Options provided:  -- Sepsis, present on admission  -- Pyonephrosis  without Sepsis  -- Sepsis was ruled out  -- Other - I will add my own diagnosis  -- Disagree - Not applicable / Not valid  -- Disagree - Clinically unable to determine / Unknown  -- Refer to Clinical Documentation Reviewer    PROVIDER RESPONSE TEXT:    This patient has sepsis which was present on admission.    Query created by: Lashawn Osullivan on 2024 8:59 PM      Electronically signed by:  Vinnie Aceves DO 2024 12:12 AM          
ACUTE OCCUPATIONAL THERAPY GOALS:   (Developed with and agreed upon by patient and/or caregiver.)  1. Patient will complete lower body bathing and dressing with modified independence and adaptive equipment as needed.   2. Patient will complete toileting with independence.   3. Patient will tolerate 30 minutes of OT treatment with 1-2 rest breaks to increase activity tolerance for ADLs.   4. Patient will complete functional transfers and functional mobility with independence and appropriate safety awareness.   5. Patient will complete standing sink side for ADL with independence to improve standing balance for daily tasks.      Timeframe: 7 visits     OCCUPATIONAL THERAPY: Daily Note AM   OT Visit Days: 2   Time In/Out  OT Charge Capture  Rehab Caseload Tracker  OT Orders    Schuyler Robins is a 88 y.o. male   PRIMARY DIAGNOSIS: Hydronephrosis of left kidney  Complicated UTI (urinary tract infection) [N39.0]       Inpatient: Payor: [employer] MEDICARE / Plan: [employer] CHOICE-O MEDICARE / Product Type: *No Product type* /     ASSESSMENT:     REHAB RECOMMENDATIONS:   Recommendation to date pending progress:  Setting:  Home Health Therapy    Equipment:    Rolling Walker     ASSESSMENT:  Mr. Robins was seated in chair upon arrival, agreeable to OT treatment focusing on home safety, fall prevention, functional transfers and mobility. Initially, pt required CGA/HHA for functional mobility of household distances but then progressed to SBA when using RW. He performed toilet transfer and standing grooming task with SBA. Good progress, continue POC.       SUBJECTIVE:     Mr. Robins states, \"I would use one if I had one\"     Social/Functional Lives With: Spouse  Type of Home: House  Home Layout: One level  Entrance Stairs - Number of Steps: 2  Bathroom Shower/Tub: Walk-in shower, Tub/Shower unit  Home Equipment: None  ADL Assistance: Independent  Ambulation Assistance: Independent    OBJECTIVE:     LINES / DRAINS / AIRWAY: 
ACUTE OCCUPATIONAL THERAPY GOALS:   (Developed with and agreed upon by patient and/or caregiver.)  1. Patient will complete lower body bathing and dressing with modified independence and adaptive equipment as needed.   2. Patient will complete toileting with independence.   3. Patient will tolerate 30 minutes of OT treatment with 1-2 rest breaks to increase activity tolerance for ADLs.   4. Patient will complete functional transfers and functional mobility with independence and appropriate safety awareness.   5. Patient will complete standing sink side for ADL with independence to improve standing balance for daily tasks.     Timeframe: 7 visits       OCCUPATIONAL THERAPY Initial Assessment, Daily Note, and AM       OT Visit Days: 1  Acknowledge Orders  Time  OT Charge Capture  Rehab Caseload Tracker      Schuyler Robins is a 88 y.o. male   PRIMARY DIAGNOSIS: Hydronephrosis with urinary obstruction due to ureteral calculus  Complicated UTI (urinary tract infection) [N39.0]       Reason for Referral: Generalized Muscle Weakness (M62.81)  Other lack of cordination (R27.8)  Inpatient: Payor: [employer]ICARE / Plan: [employer] CHOICE-O MEDICARE / Product Type: *No Product type* /     ASSESSMENT:     REHAB RECOMMENDATIONS:   Recommendation to date pending progress:  Setting:  No further skilled occupational therapy after discharge from hospital  Will defer to PT for outpatient     Equipment:    To Be Determined     ASSESSMENT:  Mr. Robins presents to the hospital with complicated UTI. Pt was supine in the bed upon arrival. Pt c/o pain when urinating. He typically lives with his wife and reports that typically he is fairly independent  at baseline. Pt reports over the last few years he noticed some \"shuffling\" and reports tripping/near falls. Pt pleasant and cooperative this session. He is a little unsteady at times needing overall CGA for functional transfers and functional mobility. Pt was able to tolerate standing 
ACUTE PHYSICAL THERAPY GOALS:   (Developed with and agreed upon by patient and/or caregiver.)  LTG:  (1.)Mr. Robins will move from supine to sit and sit to supine , scoot up and down, and roll side to side in bed with INDEPENDENT within 7 treatment day(s).    (2.)Mr. Robins will transfer from bed to chair and chair to bed with INDEPENDENT using the least restrictive device within 7 treatment day(s).    (3.)Mr. Robins will ambulate with STAND BY ASSIST for 250+ feet with the least restrictive device within 7 treatment day(s).    ________________________________________________________________________________________________       PHYSICAL THERAPY Initial Assessment and AM  (Link to Caseload Tracking: PT Visit Days : 1  Acknowledge Orders  Time In/Out  PT Charge Capture  Rehab Caseload Tracker    Schuyler Robins is a 88 y.o. male   PRIMARY DIAGNOSIS: Hydronephrosis of left kidney  Complicated UTI (urinary tract infection) [N39.0]       Reason for Referral: Generalized Muscle Weakness (M62.81)  Difficulty in walking, Not elsewhere classified (R26.2)  Inpatient: Payor: [email or web address] MEDICARE / Plan: [employer] CHOICE-PPO MEDICARE / Product Type: *No Product type* /     ASSESSMENT:     REHAB RECOMMENDATIONS:   Recommendation to date pending progress:  Setting:  Outpatient Therapy for balance if needed    Equipment:    To Be Determined     ASSESSMENT:  Mr. Robins presents to hospital with UTI. Pt is A & O x 4, reports lives with spouse and independent at baseline. Pt relates has felt like he shuffles more than he use to with gait and has had \"near falls\"; holding onto furniture more. Pt with SBA to MOD I with bed mobility, SBA transfers. Pt ambulates with short shuffled steps in room, SBA/CGA for mobility; ambulates in hallway without assistive device for 200', slow malick, short shuffled steps but fairly stable. PT to follow for acute care needs to address decreased transfers, ambulation and overall general functional mobility. 
Hourly rounding completed during shift. All needs met at this time. Bed L/L with call bell in reach.  Report given to oncoming RN.   
Hourly rounds complete this shift, no new complaints at this time,  bed in low, locked position, call light and bedside table within reach,  all needs met. Report to day shift nurse.    
Hourly rounds complete this shift, no new complaints at this time, : bed in low, locked position, call light and bedside table within reach,  all needs met. Report to day shift nurse.    
Hourly rounds complete this shift, no new complaints at this time,Patient NPO, CHG bath given  bed in low, locked position, call light and bedside table within reach,  all needs met. Report to day shift nurse.    
VANCO DAILY FOLLOW UP NOTE  [facility]   Pharmacy Pharmacokinetic Monitoring Service - Vancomycin    Consulting Provider: Dr. Tellez   Indication: UTI  Target Concentration: Goal AUC/CARLA 400-600 mg*hr/L  Day of Therapy: 1  Additional Antimicrobials: Ceftriaxone    Patient eligible for piperacillin-tazobactam to cefepime auto-substitution per P&T approved protocol? N/A    Pertinent Laboratory Values:   Wt Readings from Last 1 Encounters:   01/29/24 88.9 kg (196 lb)     Temp Readings from Last 1 Encounters:   01/29/24 97.9 °F (36.6 °C) (Axillary)     Recent Labs     01/28/24  1237 01/29/24  0627   BUN 38* 41*   CREATININE 2.62* 2.50*   WBC 19.5* 15.4*   PROCAL 1.26*  --    LACTSEPSIS 1.8  --      Estimated Creatinine Clearance: 22 mL/min (A) (based on SCr of 2.5 mg/dL (H)).    No results found for: \"VANCOTROUGH\", \"VANCORANDOM\"    MRSA Nasal Swab: N/A. Non-respiratory infection    Assessment:  Date/Time Dose Concentration AUC         Note: Serum concentrations collected for AUC dosing may appear elevated if collected in close proximity to the dose administered, this is not necessarily an indication of toxicity    Start vancomycin 500 mg Q24H  Anticipated AUC of 492 and trough concentration of 17.4 at steady state  Renal labs as indicated   Vancomycin concentrations will be ordered as clinically appropriate   Pharmacy will continue to monitor patient and adjust therapy as indicated    Thank you for the consult,  Ender Garcia [facility]    
VANCO NOTE RENAL INSUFFICIENCY PATIENT   [facility]   Pharmacy Pharmacokinetic Monitoring Service - Vancomycin    Consulting Provider: Rosalinda   Indication: UTI  Target Concentration: Random level ? 20 mg/L  Day of Therapy: 1  Additional Antimicrobials: Rocephin    Patient eligible for piperacillin-tazobactam to cefepime auto-substitution per P&T approved protocol? N/A    Pertinent Laboratory Values:   Wt Readings from Last 1 Encounters:   01/28/24 87.1 kg (192 lb)     Temp Readings from Last 1 Encounters:   01/28/24 98.6 °F (37 °C) (Oral)     Recent Labs     01/28/24  1237   BUN 38*   CREATININE 2.62*   WBC 19.5*   PROCAL 1.26*   LACTSEPSIS 1.8       No results found for: \"VANCOTROUGH\", \"VANCORANDOM\"    MRSA Nasal Swab: N/A. Non-respiratory infection.    Assessment:  Date:  Dose/Freq Admin Times Level/Time:   1/28 1750 mg x1 (2030)                              Plan:  Concentration-guided dosing due to renal impairment  Start vancomycin 1750 mg x1, then dose per random levels  Vancomycin concentrations will be ordered as clinically appropriate   Pharmacy will continue to monitor patient and adjust therapy as indicated    Thank you for the consult,  Mary Herrera, PharmD, BCPS        
Lateral Scooting, Transfer Training, Ambulation on level ground, Sitting balance , and Standing balance to improve functional Activity tolerance, Balance, Coordination, Mobility, and Strength.  Gait Training (28 Minutes): Gait training for 2 x 300 feet utilizing Rolling Walker and None. Patient required Manual and Verbal cueing to improve Assistive Device Utilization and Gait Mechanics.     TREATMENT GRID:  N/A    AFTER TREATMENT PRECAUTIONS: Chair, Needs within reach, and RN notified    INTERDISCIPLINARY COLLABORATION:  RN/ PCT, PT/ PTA, and OT/ WEI    EDUCATION:      TIME IN/OUT:  Time In: 1010 (1140)  Time Out: 1036 (1208)  Minutes: 26    SWETHA SCHAEFFER, PTA   
3.0 2.8 - 4.5 g/dL    Albumin/Globulin Ratio 1.3 0.4 - 1.6     Procalcitonin    Collection Time: 01/28/24 12:37 PM   Result Value Ref Range    Procalcitonin 1.26 (H) 0.00 - 0.49 ng/mL   Lactate, Sepsis    Collection Time: 01/28/24 12:37 PM   Result Value Ref Range    Lactic Acid, Sepsis 1.8 0.4 - 2.0 MMOL/L   Culture, Blood 1    Collection Time: 01/28/24  1:28 PM    Specimen: Blood   Result Value Ref Range    Special Requests RIGHT  FOREARM        Culture NO GROWTH AFTER 15 HOURS     Culture, Blood 1    Collection Time: 01/28/24  1:32 PM    Specimen: Blood   Result Value Ref Range    Special Requests LEFT  Antecubital        Culture NO GROWTH AFTER 15 HOURS     Hemoglobin A1c    Collection Time: 01/28/24  6:20 PM   Result Value Ref Range    Hemoglobin A1C 6.7 (H) 4.8 - 5.6 %    Estimated Avg Glucose 146 mg/dL   POCT Glucose    Collection Time: 01/28/24  8:43 PM   Result Value Ref Range    POC Glucose 149 (H) 65 - 100 mg/dL    [facility]    Basic Metabolic Panel w/ Reflex to MG    Collection Time: 01/29/24  6:27 AM   Result Value Ref Range    Sodium 139 136 - 146 mmol/L    Potassium 4.0 3.5 - 5.1 mmol/L    Chloride 112 103 - 113 mmol/L    CO2 21 21 - 32 mmol/L    Anion Gap 6 2 - 11 mmol/L    Glucose 150 (H) 65 - 100 mg/dL    BUN 41 (H) 8 - 23 MG/DL    Creatinine 2.50 (H) 0.8 - 1.5 MG/DL    Est, Glom Filt Rate 24 (L) >60 ml/min/1.73m2    Calcium 8.1 (L) 8.3 - 10.4 MG/DL   CBC with Auto Differential    Collection Time: 01/29/24  6:27 AM   Result Value Ref Range    WBC 15.4 (H) 4.3 - 11.1 K/uL    RBC 4.31 4.23 - 5.6 M/uL    Hemoglobin 12.6 (L) 13.6 - 17.2 g/dL    Hematocrit 39.0 (L) 41.1 - 50.3 %    MCV 90.5 82 - 102 FL    MCH 29.2 26.1 - 32.9 PG    MCHC 32.3 31.4 - 35.0 g/dL    RDW 13.6 11.9 - 14.6 %    Platelets 188 150 - 450 K/uL    MPV 10.6 9.4 - 12.3 FL    nRBC 0.00 0.0 - 0.2 K/uL    Differential Type AUTOMATED      Neutrophils % 80 (H) 43 - 78 %    Lymphocytes % 9 (L) 13 - 44 %    Monocytes % 10

## 2024-01-31 NOTE — CARE COORDINATION
Discharge note:  ARANZA provided Mr. Julienne freitas RW from [facility] (order faxed to them).  He declines home health OT and PT, and will call his PCP if he changes his mind.  [employer] ride arranged for 2:45 pm.      01/31/24 1322   Service Assessment   Patient Orientation Alert and Oriented   Cognition Alert   Services At/After Discharge   Services At/After Discharge Home Health  (however, he declines home health, will call PCP if needed)   Mode of Transport at Discharge Other (see comment)  ([employer])   Confirm Follow Up Transport Friends   Condition of Participation: Discharge Planning   The Plan for Transition of Care is related to the following treatment goals: home, no needs

## 2024-01-31 NOTE — TELEPHONE ENCOUNTER
----- Message from Kali Timmons DO sent at 1/29/2024 10:48 AM EST -----  Regarding: pt needs ov wtih Neal in 2-3 wks.  Admitted for another UTI. Thanks.

## 2024-01-31 NOTE — ADT AUTH CERT
UPDATED PROGRESS NOTES 24  Patient Information    Patient Name   Schuyler Robins Legal Sex   Male    1935 [facility]        Progress Notes by Abdifatah Swann MD at 2024  3:33 PM    Author: Abdifatah Swann MD Service: Internal Medicine Author Type: Physician   Filed: 2024  3:39 PM Date of Service: 2024  3:33 PM Status: Signed   : Abdifatah Swann MD (Physician)   Expand All Collapse All                                                                                                                                                                                                                                                                                                                                                                                       Hospitalist Progress Note   Admit Date:     2024  4:58 PM   Name:              Schuyler Robins   Age:                 88 y.o.  Sex:                 male  :               1935   MRN:               [id number]   Room:              [facility]     Presenting/Chief Complaint: No chief complaint on file.     Reason(s) for Admission: Complicated UTI (urinary tract infection) [N39.0]      Hospital Course:   Please refer to the admission H&P for details of presentation.       In summary, Schuyler Robins is a 88 y.o. male with medical history significant for  SARAVANAN, CKD 4 DM2 who presented with several day duration of increased urinary frequency with malodorous urine.  Workup in the ED revealed urinary tract infection with WBC of 19.5.  CXR shows possible trace left pleural effusion and left lung base airspace disease could be atelectasis and/or pneumonia.  CTAP shows diffuse bladder wall thickening suspicious for cystitis; mild to moderate left hydroureter; 6mm calcification inferior to distal L ureter likely phlebolith; distal ureteral calculus not excluded; right posterior bladder diverticulum; bilateral inguinal hernias.

## 2024-01-31 NOTE — DISCHARGE SUMMARY
Hospitalist Discharge Summary   Admit Date:  2024  4:58 PM   DC Note date: 2024  Name:  Schuyler Robins   Age:  88 y.o.  Sex:  male  :  1935   MRN:  [id number]   Room:  [facility]  PCP:  Bernabe Lawler MD    Presenting Complaint: No chief complaint on file.     Initial Admission Diagnosis: Complicated UTI (urinary tract infection) [N39.0]     Problem List for this Hospitalization (present on admission):    Principal Problem:    Hydronephrosis of left kidney  Active Problems:    SARAVANAN (obstructive sleep apnea)    Primary hypothyroidism    Hypertension    Benign non-nodular prostatic hyperplasia with lower urinary tract symptoms    Chronic kidney disease, stage IV (severe) (HCC)    Type 2 diabetes mellitus without complication, without long-term current use of insulin (HCC)    Complicated UTI (urinary tract infection)  Resolved Problems:    * No resolved hospital problems. *      Hospital Course:  Please refer to the admission H&P for details of presentation. In summary, Schuyler Robins is a 88 y.o. male with past medical history significant for SARAVANAN, CKD 4 DM2 who presented with several day duration of increased urinary frequency with malodorous urine.  Workup in the ED revealed urinary tract infection with WBC of 19.5.  CXR shows possible trace left pleural effusion and left lung base airspace disease could be atelectasis and/or pneumonia.  CTAP shows diffuse bladder wall thickening suspicious for cystitis; mild to moderate left hydroureter; 6mm calcification inferior to distal L ureter likely phlebolith; distal ureteral calculus not excluded; right posterior bladder diverticulum; bilateral inguinal hernias.     Patient was started on IV antibiotics.  Urine culture is growing Pansensitive E. coli.  Urology was consulted.  No acute intervention planned.  Patient is to follow-up with Dr. Servin upon discharge.  Patient is medically stable for discharge. Patient is to continue taking

## 2024-02-02 LAB
BACTERIA SPEC CULT: NORMAL
BACTERIA SPEC CULT: NORMAL
SERVICE CMNT-IMP: NORMAL
SERVICE CMNT-IMP: NORMAL

## 2024-02-21 ASSESSMENT — ENCOUNTER SYMPTOMS: BACK PAIN: 0

## 2024-02-21 NOTE — PROGRESS NOTES
recurrent UTIs. Negative for hematuria.  Musculoskeletal:  Negative for back pain.      Urinalysis  UA - Dipstick  Results for orders placed or performed in visit on 02/22/24   AMB POC URINALYSIS DIP STICK AUTO W/O MICRO   Result Value Ref Range    Color (UA POC)      Clarity (UA POC)      Glucose, Urine,      Bilirubin, Urine, POC Negative     KETONES, Urine, POC Negative     Specific Gravity, Urine, POC 1.020 1.001 - 1.035    Blood (UA POC) Moderate     pH, Urine, POC 6.0 4.6 - 8.0    Protein, Urine,      Urobilinogen, POC 0.2 mg/dL     Nitrite, Urine, POC Positive     Leukocyte Esterase, Urine, POC Large    Results for orders placed or performed in visit on 11/14/22   AMB POC URINALYSIS DIP STICK AUTO W/O MICRO   Result Value Ref Range    Color, Urine, POC      Clarity, Urine, POC      Glucose, Urine,  Negative    Bilirubin, Urine, POC Negative Negative    Ketones, Urine, POC Negative Negative    Specific Gravity, Urine, POC 1.020 1.001 - 1.035    Blood, Urine, POC Moderate Negative    pH, Urine, POC 7.0 4.6 - 8.0    Protein, Urine,  Negative    Urobilinogen, POC 0.2     Nitrite, Urine, POC Negative Negative    Leukocyte Esterase, Urine, POC Trace Negative       There were no vitals taken for this visit.     GENERAL: NAD, ALERT, A&O x 3, GAIT NORMAL  LUNGS: easy work of breathing  ABDOMEN: soft, non tender  NEUROLOGIC: cranial nerves 2-12 grossly intact           Assessment and Plan    ICD-10-CM    1. Recurrent UTI  N39.0 AMB POC URINALYSIS DIP STICK AUTO W/O MICRO      2. BPH with obstruction/lower urinary tract symptoms  N40.1     N13.8           He appears to have another UTI.  Urine culture was sent and he will begin vantin prescribed earlier this week by his primary MD.  I recommended TURP.  He wants to wait and return in 8 weeks.  He may agree if he has another uti.    I have spent 30 minutes today reviewing previous notes, test results and face to face with the patient as well

## 2024-02-22 ENCOUNTER — OFFICE VISIT (OUTPATIENT)
Dept: UROLOGY | Age: 89
End: 2024-02-22
Payer: MEDICARE

## 2024-02-22 DIAGNOSIS — N39.0 RECURRENT UTI: ICD-10-CM

## 2024-02-22 DIAGNOSIS — N39.0 RECURRENT UTI: Primary | ICD-10-CM

## 2024-02-22 DIAGNOSIS — N40.1 BPH WITH OBSTRUCTION/LOWER URINARY TRACT SYMPTOMS: ICD-10-CM

## 2024-02-22 DIAGNOSIS — N13.8 BPH WITH OBSTRUCTION/LOWER URINARY TRACT SYMPTOMS: ICD-10-CM

## 2024-02-22 LAB
BILIRUBIN, URINE, POC: NEGATIVE
BLOOD URINE, POC: NORMAL
GLUCOSE URINE, POC: 250
KETONES, URINE, POC: NEGATIVE
LEUKOCYTE ESTERASE, URINE, POC: NORMAL
NITRITE, URINE, POC: POSITIVE
PH, URINE, POC: 6 (ref 4.6–8)
PROTEIN,URINE, POC: 100
SPECIFIC GRAVITY, URINE, POC: 1.02 (ref 1–1.03)
URINALYSIS CLARITY, POC: NORMAL
URINALYSIS COLOR, POC: NORMAL
UROBILINOGEN, POC: NORMAL

## 2024-02-22 PROCEDURE — G8427 DOCREV CUR MEDS BY ELIG CLIN: HCPCS | Performed by: UROLOGY

## 2024-02-22 PROCEDURE — 1111F DSCHRG MED/CURRENT MED MERGE: CPT | Performed by: UROLOGY

## 2024-02-22 PROCEDURE — 99214 OFFICE O/P EST MOD 30 MIN: CPT | Performed by: UROLOGY

## 2024-02-22 PROCEDURE — G8417 CALC BMI ABV UP PARAM F/U: HCPCS | Performed by: UROLOGY

## 2024-02-22 PROCEDURE — 81003 URINALYSIS AUTO W/O SCOPE: CPT | Performed by: UROLOGY

## 2024-02-22 PROCEDURE — 1036F TOBACCO NON-USER: CPT | Performed by: UROLOGY

## 2024-02-22 PROCEDURE — 1123F ACP DISCUSS/DSCN MKR DOCD: CPT | Performed by: UROLOGY

## 2024-02-22 PROCEDURE — G8484 FLU IMMUNIZE NO ADMIN: HCPCS | Performed by: UROLOGY

## 2024-02-25 LAB
BACTERIA SPEC CULT: ABNORMAL
SERVICE CMNT-IMP: ABNORMAL

## 2024-03-01 ENCOUNTER — TELEPHONE (OUTPATIENT)
Dept: UROLOGY | Age: 89
End: 2024-03-01

## 2024-03-01 DIAGNOSIS — R35.0 URINARY FREQUENCY: ICD-10-CM

## 2024-03-01 DIAGNOSIS — E03.9 PRIMARY HYPOTHYROIDISM: ICD-10-CM

## 2024-03-01 DIAGNOSIS — N39.0 RECURRENT UTI: ICD-10-CM

## 2024-03-01 DIAGNOSIS — N39.0 RECURRENT UTI: Primary | ICD-10-CM

## 2024-03-01 LAB
APPEARANCE UR: CLEAR
BACTERIA URNS QL MICRO: NEGATIVE /HPF
BILIRUB UR QL: NEGATIVE
CASTS URNS QL MICRO: ABNORMAL /LPF (ref 0–2)
COLOR UR: ABNORMAL
EPI CELLS #/AREA URNS HPF: ABNORMAL /HPF (ref 0–5)
GLUCOSE UR STRIP.AUTO-MCNC: 500 MG/DL
HGB UR QL STRIP: ABNORMAL
KETONES UR QL STRIP.AUTO: NEGATIVE MG/DL
LEUKOCYTE ESTERASE UR QL STRIP.AUTO: NEGATIVE
NITRITE UR QL STRIP.AUTO: NEGATIVE
PH UR STRIP: 6 (ref 5–9)
PROT UR STRIP-MCNC: 30 MG/DL
RBC #/AREA URNS HPF: ABNORMAL /HPF (ref 0–5)
SP GR UR REFRACTOMETRY: 1.02 (ref 1–1.02)
T4 FREE SERPL-MCNC: 1.1 NG/DL (ref 0.78–1.46)
TSH W FREE THYROID IF ABNORMAL: 7.44 UIU/ML (ref 0.36–3.74)
UROBILINOGEN UR QL STRIP.AUTO: 0.2 EU/DL (ref 0.2–1)
WBC URNS QL MICRO: ABNORMAL /HPF (ref 0–4)

## 2024-03-02 ENCOUNTER — HOSPITAL ENCOUNTER (EMERGENCY)
Age: 89
Discharge: LEFT BEFORE DISCHARGE | End: 2024-03-03
Attending: EMERGENCY MEDICINE
Payer: MEDICARE

## 2024-03-02 ENCOUNTER — APPOINTMENT (OUTPATIENT)
Dept: GENERAL RADIOLOGY | Age: 89
End: 2024-03-02
Payer: MEDICARE

## 2024-03-02 DIAGNOSIS — A41.9 SEPTICEMIA (HCC): ICD-10-CM

## 2024-03-02 DIAGNOSIS — J20.8 ACUTE BRONCHITIS DUE TO COVID-19 VIRUS: ICD-10-CM

## 2024-03-02 DIAGNOSIS — U07.1 ACUTE BRONCHITIS DUE TO COVID-19 VIRUS: ICD-10-CM

## 2024-03-02 DIAGNOSIS — N30.00 ACUTE CYSTITIS WITHOUT HEMATURIA: Primary | ICD-10-CM

## 2024-03-02 LAB
BASOPHILS # BLD: 0.1 K/UL (ref 0–0.2)
BASOPHILS NFR BLD: 0 % (ref 0–2)
DIFFERENTIAL METHOD BLD: ABNORMAL
EOSINOPHIL # BLD: 0 K/UL (ref 0–0.8)
EOSINOPHIL NFR BLD: 0 % (ref 0.5–7.8)
ERYTHROCYTE [DISTWIDTH] IN BLOOD BY AUTOMATED COUNT: 13.9 % (ref 11.9–14.6)
FLUAV RNA SPEC QL NAA+PROBE: NOT DETECTED
FLUBV RNA SPEC QL NAA+PROBE: NOT DETECTED
HCT VFR BLD AUTO: 37.7 % (ref 41.1–50.3)
HGB BLD-MCNC: 12.6 G/DL (ref 13.6–17.2)
IMM GRANULOCYTES # BLD AUTO: 0.1 K/UL (ref 0–0.5)
IMM GRANULOCYTES NFR BLD AUTO: 1 % (ref 0–5)
LYMPHOCYTES # BLD: 0.8 K/UL (ref 0.5–4.6)
LYMPHOCYTES NFR BLD: 5 % (ref 13–44)
MCH RBC QN AUTO: 29.4 PG (ref 26.1–32.9)
MCHC RBC AUTO-ENTMCNC: 33.4 G/DL (ref 31.4–35)
MCV RBC AUTO: 88.1 FL (ref 82–102)
MONOCYTES # BLD: 1 K/UL (ref 0.1–1.3)
MONOCYTES NFR BLD: 6 % (ref 4–12)
NEUTS SEG # BLD: 14.1 K/UL (ref 1.7–8.2)
NEUTS SEG NFR BLD: 88 % (ref 43–78)
NRBC # BLD: 0 K/UL (ref 0–0.2)
PLATELET # BLD AUTO: 207 K/UL (ref 150–450)
RBC # BLD AUTO: 4.28 M/UL (ref 4.23–5.6)
SARS-COV-2 RDRP RESP QL NAA+PROBE: DETECTED
SOURCE: ABNORMAL
WBC # BLD AUTO: 16.1 K/UL (ref 4.3–11.1)

## 2024-03-02 PROCEDURE — 2580000003 HC RX 258: Performed by: EMERGENCY MEDICINE

## 2024-03-02 PROCEDURE — 87635 SARS-COV-2 COVID-19 AMP PRB: CPT

## 2024-03-02 PROCEDURE — 96374 THER/PROPH/DIAG INJ IV PUSH: CPT

## 2024-03-02 PROCEDURE — 80053 COMPREHEN METABOLIC PANEL: CPT

## 2024-03-02 PROCEDURE — 99285 EMERGENCY DEPT VISIT HI MDM: CPT

## 2024-03-02 PROCEDURE — 83605 ASSAY OF LACTIC ACID: CPT

## 2024-03-02 PROCEDURE — 71045 X-RAY EXAM CHEST 1 VIEW: CPT

## 2024-03-02 PROCEDURE — 84145 PROCALCITONIN (PCT): CPT

## 2024-03-02 PROCEDURE — 87040 BLOOD CULTURE FOR BACTERIA: CPT

## 2024-03-02 PROCEDURE — 85025 COMPLETE CBC W/AUTO DIFF WBC: CPT

## 2024-03-02 PROCEDURE — 87502 INFLUENZA DNA AMP PROBE: CPT

## 2024-03-02 PROCEDURE — 6370000000 HC RX 637 (ALT 250 FOR IP): Performed by: EMERGENCY MEDICINE

## 2024-03-02 PROCEDURE — 93005 ELECTROCARDIOGRAM TRACING: CPT | Performed by: EMERGENCY MEDICINE

## 2024-03-02 RX ORDER — 0.9 % SODIUM CHLORIDE 0.9 %
1000 INTRAVENOUS SOLUTION INTRAVENOUS
Status: COMPLETED | OUTPATIENT
Start: 2024-03-02 | End: 2024-03-03

## 2024-03-02 RX ORDER — ACETAMINOPHEN 500 MG
1000 TABLET ORAL
Status: COMPLETED | OUTPATIENT
Start: 2024-03-02 | End: 2024-03-02

## 2024-03-02 RX ADMIN — SODIUM CHLORIDE 1000 ML: 9 INJECTION, SOLUTION INTRAVENOUS at 23:37

## 2024-03-02 RX ADMIN — ACETAMINOPHEN 1000 MG: 500 TABLET, FILM COATED ORAL at 23:34

## 2024-03-02 ASSESSMENT — PAIN DESCRIPTION - PAIN TYPE: TYPE: ACUTE PAIN

## 2024-03-02 ASSESSMENT — PAIN DESCRIPTION - DESCRIPTORS: DESCRIPTORS: ACHING

## 2024-03-02 ASSESSMENT — LIFESTYLE VARIABLES: HOW OFTEN DO YOU HAVE A DRINK CONTAINING ALCOHOL: NEVER

## 2024-03-02 ASSESSMENT — PAIN DESCRIPTION - FREQUENCY: FREQUENCY: INTERMITTENT

## 2024-03-02 ASSESSMENT — PAIN DESCRIPTION - LOCATION: LOCATION: PELVIS

## 2024-03-02 ASSESSMENT — PAIN SCALES - GENERAL: PAINLEVEL_OUTOF10: 8

## 2024-03-02 ASSESSMENT — PAIN - FUNCTIONAL ASSESSMENT: PAIN_FUNCTIONAL_ASSESSMENT: 0-10

## 2024-03-03 VITALS
HEIGHT: 68 IN | RESPIRATION RATE: 20 BRPM | SYSTOLIC BLOOD PRESSURE: 146 MMHG | BODY MASS INDEX: 30.01 KG/M2 | HEART RATE: 84 BPM | WEIGHT: 198 LBS | TEMPERATURE: 98.7 F | DIASTOLIC BLOOD PRESSURE: 93 MMHG | OXYGEN SATURATION: 96 %

## 2024-03-03 LAB
ALBUMIN SERPL-MCNC: 3.7 G/DL (ref 3.2–4.6)
ALBUMIN/GLOB SERPL: 1.2 (ref 0.4–1.6)
ALP SERPL-CCNC: 82 U/L (ref 45–117)
ALT SERPL-CCNC: 14 U/L (ref 13–61)
ANION GAP SERPL CALC-SCNC: 17 MMOL/L (ref 2–11)
APPEARANCE UR: ABNORMAL
AST SERPL-CCNC: 15 U/L (ref 15–37)
BACTERIA URNS QL MICRO: ABNORMAL /HPF
BILIRUB SERPL-MCNC: 0.4 MG/DL (ref 0.2–1.1)
BUN SERPL-MCNC: 32 MG/DL (ref 8–23)
CALCIUM SERPL-MCNC: 8.2 MG/DL (ref 8.3–10.4)
CASTS URNS QL MICRO: 0 /LPF
CHLORIDE SERPL-SCNC: 99 MMOL/L (ref 98–107)
CO2 SERPL-SCNC: 18 MMOL/L (ref 21–32)
COLOR UR: YELLOW
CREAT SERPL-MCNC: 2.43 MG/DL (ref 0.8–1.5)
CRYSTALS URNS QL MICRO: 0 /LPF
EKG ATRIAL RATE: 94 BPM
EKG DIAGNOSIS: NORMAL
EKG P-R INTERVAL: 302 MS
EKG Q-T INTERVAL: 368 MS
EKG QTC CALCULATION (BAZETT): 461 MS
EKG R AXIS: 53 DEGREES
EKG T AXIS: -34 DEGREES
EKG VENTRICULAR RATE: 94 BPM
EPI CELLS #/AREA URNS HPF: 0 /HPF
GLOBULIN SER CALC-MCNC: 3.1 G/DL (ref 2.8–4.5)
GLUCOSE SERPL-MCNC: 222 MG/DL (ref 65–100)
GLUCOSE UR STRIP.AUTO-MCNC: 100 MG/DL
HGB UR QL STRIP: ABNORMAL
KETONES UR QL STRIP.AUTO: NEGATIVE MG/DL
LACTATE SERPL-SCNC: 1 MMOL/L (ref 0.4–2)
MUCOUS THREADS URNS QL MICRO: 0 /LPF
NITRITE UR QL STRIP.AUTO: POSITIVE
OTHER OBSERVATIONS: ABNORMAL
PH UR STRIP: 5.5 (ref 5–9)
POTASSIUM SERPL-SCNC: 4.1 MMOL/L (ref 3.5–5.1)
PROT SERPL-MCNC: 6.8 G/DL (ref 6.4–8.2)
RBC #/AREA URNS HPF: ABNORMAL /HPF
SODIUM SERPL-SCNC: 134 MMOL/L (ref 133–143)
SP GR UR REFRACTOMETRY: 1.01 (ref 1–1.02)
UROBILINOGEN UR QL STRIP.AUTO: 0.2 EU/DL (ref 0.2–1)
WBC URNS QL MICRO: ABNORMAL /HPF

## 2024-03-03 PROCEDURE — 87086 URINE CULTURE/COLONY COUNT: CPT

## 2024-03-03 PROCEDURE — 6360000002 HC RX W HCPCS: Performed by: EMERGENCY MEDICINE

## 2024-03-03 PROCEDURE — 2580000003 HC RX 258: Performed by: EMERGENCY MEDICINE

## 2024-03-03 PROCEDURE — 87040 BLOOD CULTURE FOR BACTERIA: CPT

## 2024-03-03 PROCEDURE — 87186 SC STD MICRODIL/AGAR DIL: CPT

## 2024-03-03 PROCEDURE — 87088 URINE BACTERIA CULTURE: CPT

## 2024-03-03 PROCEDURE — 93010 ELECTROCARDIOGRAM REPORT: CPT | Performed by: INTERNAL MEDICINE

## 2024-03-03 PROCEDURE — 81001 URINALYSIS AUTO W/SCOPE: CPT

## 2024-03-03 RX ORDER — CEFPODOXIME PROXETIL 100 MG/1
100 TABLET, FILM COATED ORAL 2 TIMES DAILY
Qty: 20 TABLET | Refills: 0 | Status: SHIPPED | OUTPATIENT
Start: 2024-03-03 | End: 2024-03-13

## 2024-03-03 RX ORDER — NIRMATRELVIR AND RITONAVIR 150-100 MG
1 KIT ORAL EVERY 12 HOURS
Qty: 10 EACH | Refills: 0 | Status: SHIPPED | OUTPATIENT
Start: 2024-03-03 | End: 2024-03-03

## 2024-03-03 RX ORDER — DEXAMETHASONE 2 MG/1
TABLET ORAL
Qty: 42 TABLET | Refills: 0 | Status: SHIPPED | OUTPATIENT
Start: 2024-03-03 | End: 2024-03-03

## 2024-03-03 RX ORDER — CEFPODOXIME PROXETIL 100 MG/1
100 TABLET, FILM COATED ORAL 2 TIMES DAILY
Qty: 20 TABLET | Refills: 0 | Status: SHIPPED | OUTPATIENT
Start: 2024-03-03 | End: 2024-03-03

## 2024-03-03 RX ORDER — NIRMATRELVIR AND RITONAVIR 150-100 MG
1 KIT ORAL EVERY 12 HOURS
Qty: 10 EACH | Refills: 0 | Status: SHIPPED | OUTPATIENT
Start: 2024-03-03 | End: 2024-03-08

## 2024-03-03 RX ORDER — DEXAMETHASONE 2 MG/1
TABLET ORAL
Qty: 42 TABLET | Refills: 0 | Status: SHIPPED | OUTPATIENT
Start: 2024-03-03 | End: 2024-03-15

## 2024-03-03 RX ADMIN — WATER 1000 MG: 1 INJECTION INTRAMUSCULAR; INTRAVENOUS; SUBCUTANEOUS at 00:02

## 2024-03-03 ASSESSMENT — PAIN - FUNCTIONAL ASSESSMENT: PAIN_FUNCTIONAL_ASSESSMENT: NONE - DENIES PAIN

## 2024-03-03 NOTE — ED TRIAGE NOTES
Ambulatory to room 10, assisted by spouse. Reports began experiencing nausea, dry heaving, non-productive cough, nasal congestion, generalized weakness and urinary retention since yesterday. Reports hx prostate problems.

## 2024-03-03 NOTE — ED PROVIDER NOTES
Negative NEG mg/dL    Bilirubin Urine Negative NEG      Blood, Urine MODERATE (A) NEG      Urobilinogen, Urine 0.2 0.2 - 1.0 EU/dL    Nitrite, Urine Positive (A) NEG      Leukocyte Esterase, Urine LARGE (A) NEG     Urinalysis, Micro   Result Value Ref Range    WBC, UA 20-50 0 /hpf    RBC, UA 3-5 0 /hpf    Epithelial Cells UA 0 0 /hpf    BACTERIA, URINE 4+ (H) 0 /hpf    Casts 0 0 /lpf    Crystals 0 0 /LPF    Mucus, UA 0 0 /lpf    Other observations RESULTS VERIFIED MANUALLY           XR CHEST PORTABLE   Final Result            No acute pulmonary findings.                       Recent Labs     03/02/24  2337   COVID19 Detected*        Voice dictation software was used during the making of this note.  This software is not perfect and grammatical and other typographical errors may be present.  This note has not been completely proofread for errors.       Seble Ferris MD  03/03/24 0159       Seble Ferris MD  03/03/24 0208

## 2024-03-03 NOTE — ED NOTES
MD explained risks of leaving AMA, pt is AAOx4 at this time, wife at bedside. Pt is insistent that he does not want to be admitted and would like to go home. Questions answered, verbalized understanding.

## 2024-03-03 NOTE — DISCHARGE INSTRUCTIONS
Take Tylenol as needed for fever and bodyaches.  Take your steroids as prescribed for COVID.  Take the antibiotics as prescribed for the UTI.  Return to the emergency department for increased pain, intractable nausea or vomiting, altered mental status, difficulty urinating, or other concerns.

## 2024-03-05 ENCOUNTER — TELEMEDICINE (OUTPATIENT)
Dept: ENDOCRINOLOGY | Age: 89
End: 2024-03-05
Payer: MEDICARE

## 2024-03-05 DIAGNOSIS — E03.9 PRIMARY HYPOTHYROIDISM: Primary | ICD-10-CM

## 2024-03-05 DIAGNOSIS — E06.3 HASHIMOTO'S THYROIDITIS: ICD-10-CM

## 2024-03-05 DIAGNOSIS — E04.1 THYROID NODULE: ICD-10-CM

## 2024-03-05 LAB
BACTERIA SPEC CULT: ABNORMAL
SERVICE CMNT-IMP: ABNORMAL
SERVICE CMNT-IMP: ABNORMAL

## 2024-03-05 PROCEDURE — G8427 DOCREV CUR MEDS BY ELIG CLIN: HCPCS | Performed by: INTERNAL MEDICINE

## 2024-03-05 PROCEDURE — 1123F ACP DISCUSS/DSCN MKR DOCD: CPT | Performed by: INTERNAL MEDICINE

## 2024-03-05 PROCEDURE — 99214 OFFICE O/P EST MOD 30 MIN: CPT | Performed by: INTERNAL MEDICINE

## 2024-03-05 RX ORDER — LEVOTHYROXINE SODIUM 0.12 MG/1
125 TABLET ORAL DAILY
Qty: 90 TABLET | Refills: 3 | Status: SHIPPED | OUTPATIENT
Start: 2024-03-05

## 2024-03-05 ASSESSMENT — ENCOUNTER SYMPTOMS
DIARRHEA: 0
CONSTIPATION: 0

## 2024-03-05 NOTE — PROGRESS NOTES
Dl Abreu MD, FACE  [facility]  [address], [address]  [place]  [address]  Phone [phone]  Facsimile [phone]          Schuyler Robins is a 88 y.o. male seen 3/5/2024 for follow up of hypothyroidism        Schuyler Robins, was evaluated through a synchronous (real-time) audio-video encounter. The patient (or guardian if applicable) is aware that this is a billable service, which includes applicable co-pays. This Virtual Visit was conducted with patient's (and/or legal guardian's) consent. Patient identification was verified, and a caregiver was present when appropriate.   The patient was located at Home: [address] [place] [address] [place] [address]  Provider was located at Facility (Appt Dept): [address] [place] [address] Dr Denise [facility] [place] [address]        ASSESSMENT AND PLAN:    1. Primary hypothyroidism  He remains under replaced and I will increase his levothyroxine as below.  Follow up in 4 months.      - levothyroxine (SYNTHROID) 125 MCG tablet; Take 1 tablet by mouth daily  Dispense: 90 tablet; Refill: 3    2. Hashimoto's thyroiditis  Based on his thyroid ultrasound appearance and positive thyroid peroxidase antibodies, he has Hashimoto's thyroiditis.     3. Thyroid nodule  Thyroid ultrasound performed 7/2023 revealed the presence of a subcentimeter nodule in the superior right lobe.  I will consider repeating a thyroid ultrasound in 1 year from the prior study to document stability.      Follow-up and Dispositions    Return in about 4 months (around 7/5/2024).         HISTORY OF PRESENT ILLNESS:    THYROID DISEASE    Presentation/Diagnosis: Elevated TSH noted on routine lab screen.  He has stage 4 chronic kidney disease.    Symptoms: See review of systems.     Treatment: Takes generic in AM correctly.    Imaging:   Neck ultrasound 3/21/2023 ([facility]): Few scattered normal-appearing small lymph nodes in the left soft tissue neck. No

## 2024-03-06 NOTE — PROGRESS NOTES
ED pharmacist reviewed recent results of urine culture.    The patient was treated with ceftriaxone in the emergency department and received a prescription for cefpodoxime upon discharge. Based on culture results, the patient is being adequately treated for the identified infection with existing antimicrobial therapy. No further intervention needed.    Allergies as of 03/02/2024    (No Known Allergies)     Evelia Butler, PharmD.  Emergency Medicine Clinical Pharmacist

## 2024-03-08 LAB
BACTERIA SPEC CULT: NORMAL
SERVICE CMNT-IMP: NORMAL
SERVICE CMNT-IMP: NORMAL

## 2024-04-15 ENCOUNTER — OFFICE VISIT (OUTPATIENT)
Age: 89
End: 2024-04-15
Payer: MEDICARE

## 2024-04-15 VITALS
HEIGHT: 68 IN | BODY MASS INDEX: 30.01 KG/M2 | WEIGHT: 198 LBS | SYSTOLIC BLOOD PRESSURE: 136 MMHG | HEART RATE: 70 BPM | DIASTOLIC BLOOD PRESSURE: 78 MMHG

## 2024-04-15 DIAGNOSIS — R60.0 LOCALIZED EDEMA: Chronic | ICD-10-CM

## 2024-04-15 DIAGNOSIS — I10 PRIMARY HYPERTENSION: Primary | ICD-10-CM

## 2024-04-15 DIAGNOSIS — R06.02 SHORTNESS OF BREATH: ICD-10-CM

## 2024-04-15 DIAGNOSIS — E06.3 HASHIMOTO'S THYROIDITIS: ICD-10-CM

## 2024-04-15 DIAGNOSIS — G47.33 OSA (OBSTRUCTIVE SLEEP APNEA): ICD-10-CM

## 2024-04-15 PROCEDURE — 1123F ACP DISCUSS/DSCN MKR DOCD: CPT | Performed by: INTERNAL MEDICINE

## 2024-04-15 PROCEDURE — G8417 CALC BMI ABV UP PARAM F/U: HCPCS | Performed by: INTERNAL MEDICINE

## 2024-04-15 PROCEDURE — G8427 DOCREV CUR MEDS BY ELIG CLIN: HCPCS | Performed by: INTERNAL MEDICINE

## 2024-04-15 PROCEDURE — 1036F TOBACCO NON-USER: CPT | Performed by: INTERNAL MEDICINE

## 2024-04-15 PROCEDURE — 99214 OFFICE O/P EST MOD 30 MIN: CPT | Performed by: INTERNAL MEDICINE

## 2024-04-15 ASSESSMENT — ENCOUNTER SYMPTOMS
SHORTNESS OF BREATH: 0
VOMITING: 0
ABDOMINAL DISTENTION: 0
NAUSEA: 0
CONSTIPATION: 0
DIARRHEA: 0
PHOTOPHOBIA: 0
ABDOMINAL PAIN: 0
WHEEZING: 0
COUGH: 0

## 2024-04-15 NOTE — PROGRESS NOTES
[facility] CARDIOLOGY  [address] [place] [address], [address]  [place] [address]  PHONE: [phone]        NAME:  Schuyler Robins  : 1935  MRN: [id number]     PCP:  Bernabe Lawler MD    SUBJECTIVE:   Schuyler Robins is a 88 y.o. male seen for a sleep visit regarding the following:     Chief Complaint   Patient presents with    Hypertension        HPI:  Consultation was requested by Dr. Lawler for evaluation of Hypertension     He presented recently to establish new care.  He is a vigorous 88-year-old male who exercises on a regular basis without angina, abnormal exertional dyspnea, or palpitations.  He walks at 3 miles an hour at an incline getting his heart rate up to 115 to 120 bpm with no symptoms.  However, he recently was diagnosed with significant sleep apnea and since May of last year has noted progressive recurrence and worsening of nocturnal paroxysmal dyspnea and orthopnea symptoms.  He denies any other symptoms during sleep other than frequent urination which may be affecting his sleep as well.  He is having trouble adapting and adjusting to his CPAP but is trying nightly.  He has mild dependent edema at the end of the day but is on 10 mg amlodipine.       Nuclear stress test 2023:    Stress Combined Conclusion: The study is negative for myocardial ischemia. Findings suggest a low risk of cardiac events.    Stress Function: Left ventricular function post-stress is normal. Post-stress ejection fraction is 66%.    Perfusion Comments: LV perfusion is normal. There is no evidence of inducible ischemia.    Perfusion Conclusion: There is no evidence of transient ischemic dilation (TID). TID ratio is 0.99.    ECG: Resting ECG demonstrates sinus bradycardia, first-degree AV block and right bundle branch block.    Stress Test: A pharmacological stress test was performed using lexiscan. Low level exercise was used during the pharmacological stress test. Patient had no complaints of

## 2024-04-24 ASSESSMENT — ENCOUNTER SYMPTOMS: BACK PAIN: 0

## 2024-04-24 NOTE — PROGRESS NOTES
[facility] [place] [facility]  [address] [place] [address]  [phone]          Schuyler Robins  : 1935    Chief Complaint   Patient presents with    Follow-up     8 wks          HPI     Schuyler Robins is a 88 y.o. male with h/o bph and recurrent uti's who presented to [facility] 10/28/22 and was found to have UTI and 5 m LEFT proximal ureteral stone.  LEFT ureteral stent was placed urgency 10/29/22 and he is currently scheduled for cystoscopy, LEFT ureteroscopy, laser lithotripsy, LEFT ureteral stent exchange with Dr. Brasher on 22.       He has had a urolift in the past and has recurrent uti's.  Cystoscopy at time of cystoscopy, LEFT ureteroscopy, laser lithotripsy, LEFT ureteral stent exchange 22 revealed a prostate that would be amenable to TURP.       He is thrilled in that he has had NO UTI since last visit.  LUTS have improved.  He is on tamsulosin/finasteride.       He was admitted with uti at the end of .       PVR: 22 407 ml,  330 ml,  241 ml          Past Medical History:   Diagnosis Date    BPH (benign prostatic hyperplasia)     Chronic bacterial prostatitis     Chronic kidney disease (CKD), stage III (moderate) ([facility])     Deviated nasal septum     Hearing loss sensory, bilateral     Hypertension     Hypertrophy of nasal turbinates     SARAVANAN (obstructive sleep apnea)     Subdural hematoma ([facility])     Type 2 diabetes mellitus ([facility])     no medicaitons    Ureteral stone     Vestibular disequilibrium involving both inner ears      Past Surgical History:   Procedure Laterality Date    CATARACT EXTRACTION  2016    COLONOSCOPY      CYSTOSCOPY Left 2022    CYSTOSCOPY,LEFT URETEROSCOPY, LASER LITHOTRIPSY, LEFT URETERAL STENT EXCHANGE performed by Bernabe Neal MD at [facility] MAIN OR    CYSTOURETHROSCOPY  10/29/2022    ELBOW SURGERY       Current Outpatient Medications   Medication Sig Dispense Refill    levothyroxine (SYNTHROID) 125 MCG

## 2024-04-25 ENCOUNTER — OFFICE VISIT (OUTPATIENT)
Dept: UROLOGY | Age: 89
End: 2024-04-25
Payer: MEDICARE

## 2024-04-25 DIAGNOSIS — N40.1 BPH WITH OBSTRUCTION/LOWER URINARY TRACT SYMPTOMS: ICD-10-CM

## 2024-04-25 DIAGNOSIS — N39.0 RECURRENT UTI: Primary | ICD-10-CM

## 2024-04-25 DIAGNOSIS — N13.8 BPH WITH OBSTRUCTION/LOWER URINARY TRACT SYMPTOMS: ICD-10-CM

## 2024-04-25 LAB
BILIRUBIN, URINE, POC: NEGATIVE
BLOOD URINE, POC: ABNORMAL
GLUCOSE URINE, POC: 250
KETONES, URINE, POC: NEGATIVE
LEUKOCYTE ESTERASE, URINE, POC: ABNORMAL
NITRITE, URINE, POC: NEGATIVE
PH, URINE, POC: 6.5 (ref 4.6–8)
PROTEIN,URINE, POC: 30
PVR, POC: 241 CC
SPECIFIC GRAVITY, URINE, POC: 1.01 (ref 1–1.03)
URINALYSIS CLARITY, POC: ABNORMAL
URINALYSIS COLOR, POC: ABNORMAL
UROBILINOGEN, POC: ABNORMAL

## 2024-04-25 PROCEDURE — 51798 US URINE CAPACITY MEASURE: CPT | Performed by: UROLOGY

## 2024-04-25 PROCEDURE — G8427 DOCREV CUR MEDS BY ELIG CLIN: HCPCS | Performed by: UROLOGY

## 2024-04-25 PROCEDURE — 99214 OFFICE O/P EST MOD 30 MIN: CPT | Performed by: UROLOGY

## 2024-04-25 PROCEDURE — G8417 CALC BMI ABV UP PARAM F/U: HCPCS | Performed by: UROLOGY

## 2024-04-25 PROCEDURE — 81003 URINALYSIS AUTO W/O SCOPE: CPT | Performed by: UROLOGY

## 2024-04-25 PROCEDURE — 1123F ACP DISCUSS/DSCN MKR DOCD: CPT | Performed by: UROLOGY

## 2024-04-25 PROCEDURE — 1036F TOBACCO NON-USER: CPT | Performed by: UROLOGY

## 2024-06-12 DIAGNOSIS — N40.1 BPH WITH OBSTRUCTION/LOWER URINARY TRACT SYMPTOMS: ICD-10-CM

## 2024-06-12 DIAGNOSIS — N13.8 BPH WITH OBSTRUCTION/LOWER URINARY TRACT SYMPTOMS: ICD-10-CM

## 2024-06-12 RX ORDER — FINASTERIDE 5 MG/1
5 TABLET, FILM COATED ORAL DAILY
Qty: 90 TABLET | Refills: 1 | Status: SHIPPED | OUTPATIENT
Start: 2024-06-12

## 2024-06-13 ENCOUNTER — TELEPHONE (OUTPATIENT)
Age: 89
End: 2024-06-13

## 2024-06-13 NOTE — TELEPHONE ENCOUNTER
Patient says that he has been sleepy during the afternoon. He has not fell asleep at the wheel of his car . He is wanting to see Dr. Ramirez and get on the cpap

## 2024-06-14 ENCOUNTER — OFFICE VISIT (OUTPATIENT)
Dept: SLEEP MEDICINE | Age: 89
End: 2024-06-14

## 2024-06-14 VITALS
DIASTOLIC BLOOD PRESSURE: 76 MMHG | RESPIRATION RATE: 18 BRPM | SYSTOLIC BLOOD PRESSURE: 136 MMHG | BODY MASS INDEX: 30.1 KG/M2 | HEART RATE: 80 BPM | TEMPERATURE: 97.9 F | HEIGHT: 68 IN | WEIGHT: 198.6 LBS | OXYGEN SATURATION: 97 %

## 2024-06-14 DIAGNOSIS — G47.34 NOCTURNAL HYPOXEMIA: ICD-10-CM

## 2024-06-14 DIAGNOSIS — G47.10 HYPERSOMNIA: ICD-10-CM

## 2024-06-14 DIAGNOSIS — G47.33 OSA (OBSTRUCTIVE SLEEP APNEA): Primary | ICD-10-CM

## 2024-06-14 DIAGNOSIS — G47.00 PERSISTENT DISORDER OF INITIATING OR MAINTAINING SLEEP: ICD-10-CM

## 2024-06-14 DIAGNOSIS — R09.81 CHRONIC NASAL CONGESTION: ICD-10-CM

## 2024-06-14 DIAGNOSIS — E66.9 OBESITY (BMI 30-39.9): ICD-10-CM

## 2024-06-14 RX ORDER — IRBESARTAN 150 MG/1
150 TABLET ORAL DAILY
COMMUNITY
Start: 2024-04-25

## 2024-06-14 ASSESSMENT — SLEEP AND FATIGUE QUESTIONNAIRES
HOW LIKELY ARE YOU TO NOD OFF OR FALL ASLEEP WHILE SITTING INACTIVE IN A PUBLIC PLACE: HIGH CHANCE OF DOZING
HOW LIKELY ARE YOU TO NOD OFF OR FALL ASLEEP WHILE SITTING QUIETLY AFTER LUNCH WITHOUT ALCOHOL: MODERATE CHANCE OF DOZING
HOW LIKELY ARE YOU TO NOD OFF OR FALL ASLEEP WHILE SITTING AND READING: WOULD NEVER DOZE
HOW LIKELY ARE YOU TO NOD OFF OR FALL ASLEEP WHILE WATCHING TV: HIGH CHANCE OF DOZING
ESS TOTAL SCORE: 13
HOW LIKELY ARE YOU TO NOD OFF OR FALL ASLEEP WHILE LYING DOWN TO REST IN THE AFTERNOON WHEN CIRCUMSTANCES PERMIT: MODERATE CHANCE OF DOZING
HOW LIKELY ARE YOU TO NOD OFF OR FALL ASLEEP WHILE SITTING AND TALKING TO SOMEONE: WOULD NEVER DOZE
HOW LIKELY ARE YOU TO NOD OFF OR FALL ASLEEP IN A CAR, WHILE STOPPED FOR A FEW MINUTES IN TRAFFIC: WOULD NEVER DOZE
HOW LIKELY ARE YOU TO NOD OFF OR FALL ASLEEP WHEN YOU ARE A PASSENGER IN A CAR FOR AN HOUR WITHOUT A BREAK: HIGH CHANCE OF DOZING

## 2024-06-14 NOTE — PROGRESS NOTES
[phone]     Patient Name: Schuyler Robins  : 1935  Gender: male  Address: [address] [place] [address] [address] [place] [address]  Phone: [phone]     Primary Insurance: Payor: [employer] MEDICARE / Plan: [employer] CHOICE-PPO MEDICARE / Product Type: *No Product type* /   Subscriber ID: [id number] - (Medicare Managed)      AMB Supply Order  Order Details     DME Location:[facility]   Order Date: 2024   The primary encounter diagnosis was SARAVANAN (obstructive sleep apnea). Diagnoses of Nocturnal hypoxemia, Chronic nasal congestion, Persistent disorder of initiating or maintaining sleep, Hypersomnia, and Obesity (BMI 30-39.9) were also pertinent to this visit.             (  X   )Supplies Needed       autoCPAP Machine   (     ) CPAP Unit  (     ) Auto CPAP Unit  (     ) BiLevel Unit  (     ) Auto BiLevel Unit  (     ) ASV   (     ) Bilevel ST      Length of need: 12 months    Pressure:  11-14 cmH20  EPR: 3     Starting Ramp Pressure:   cm H20  Ramp Time: min      Patient had a diagnostic Apnea Hypopnea Index (AHI) of :    *SUPPLIES* Replace all as needed, or per coverage guidelines     Masks Type:  ( x   ) -Full Face Mask (1 per 3 mon)  (  x  ) -Full Mask (1 per month) Interface/Cushion      (  ) -Nasal Mask (1 per 3 mon)  (  ) - Nasal Mask (1 per month) Interface/Cushion  (     ) -Pillow (2 per mon)  (     ) -Vqdfhjtnw (1 per 6 mon)            Other Supplies:    (   X  )-Cjvpmrzv (1 per 6 mon)  (   X  )-Tgtyal Tubing (1 per 3 mon)  (   X  )- Disposable Filter (2 per mon)  (   x  )-Ufyyzv Humidifier (1 per year)     ( x    )-Kgrqiicto (sometimes used with Full Face Mask) (1 per 6 mos)  (    )-Tubing-without heat (1 per 3 mos)  (     )-Non-Disposable Filter (1 per 6 mos)  (  x   )-Water Chamber (1 per 6 mos)  (     )-Humidifier non-heated (1 per 5 yrs)      Signed Date: 2024  Electronically Signed By: Pretty Hernández,

## 2024-06-14 NOTE — PATIENT INSTRUCTIONS
Will increase CPAP pressure  I will adjust humidity and tube temperature  Increase flonase to 2 sprays each nostril in the morning and at bedtime- do after the netipot  I will check a CPAP download in 2 weeks and call to see how you are doing.

## 2024-07-08 DIAGNOSIS — N13.8 BPH WITH OBSTRUCTION/LOWER URINARY TRACT SYMPTOMS: ICD-10-CM

## 2024-07-08 DIAGNOSIS — N40.1 BPH WITH OBSTRUCTION/LOWER URINARY TRACT SYMPTOMS: ICD-10-CM

## 2024-07-08 RX ORDER — TAMSULOSIN HYDROCHLORIDE 0.4 MG/1
CAPSULE ORAL DAILY
Qty: 90 CAPSULE | Refills: 3 | Status: SHIPPED | OUTPATIENT
Start: 2024-07-08

## 2024-07-19 ENCOUNTER — TELEPHONE (OUTPATIENT)
Dept: ENDOCRINOLOGY | Age: 89
End: 2024-07-19

## 2024-07-19 DIAGNOSIS — E03.9 PRIMARY HYPOTHYROIDISM: ICD-10-CM

## 2024-07-19 LAB
T4 FREE SERPL-MCNC: 1.5 NG/DL (ref 0.9–1.7)
TSH W FREE THYROID IF ABNORMAL: 14.9 UIU/ML (ref 0.27–4.2)

## 2024-07-19 NOTE — TELEPHONE ENCOUNTER
For when you return, please send to another MA to help.        The Pt called saying he thinks something is wrong with his thyroid. But he does not know for sure. I asked him what symptoms he was having and he said if he is not concentrating on something he is easy to fall asleep even in the day. He said he thinks he is narcoleptic. He said he has been having little to no energy for the past 6 months but has became worse around the last 2 months. He stated he has not been sleeping good and that his CPAP is not working. But has been sleeping good this week. I told him that he does have labs to do in the system that might see if his levels are off. He expressed understanding and will do the labs. I told him that  is out of the office but will be back on Monday if he would like for another provider to go over this. He stated  that he only wants  and will wait till he returns. He told me that he does take his medicine everyday and correctly. Please advise

## 2024-07-21 RX ORDER — LEVOTHYROXINE SODIUM 137 UG/1
137 TABLET ORAL DAILY
Qty: 90 TABLET | Refills: 3 | Status: SHIPPED | OUTPATIENT
Start: 2024-07-21

## 2024-08-21 ENCOUNTER — TELEPHONE (OUTPATIENT)
Dept: ENDOCRINOLOGY | Age: 89
End: 2024-08-21

## 2024-08-21 NOTE — TELEPHONE ENCOUNTER
Pt left a message that he did labs yesterday that were different from his lab results in July and he wants to know if this big change is normal and does he need to change meds/dose.  Unable to locate any recent labs in chart, labs from July are viewable.    Tried to call patient, got voice mail and left message.  Tried to call patient back and got vm again.  Sent [employer] message to pt that I need yesterday's lab results.    Waiting on rtc from pt.

## 2024-10-15 NOTE — PROGRESS NOTES
[facility]  [address]  [place] [address]  [phone]    Patient Name:  Schuyler Robins  YOB: 1935      Office Visit 10/18/2024    CHIEF COMPLAINT:    Chief Complaint   Patient presents with    CPAP/BiPAP    Sleep Apnea         HISTORY OF PRESENT ILLNESS:  Patient is a 89 y.o. male seen today for follow up of SARAVANAN.    Patient states that he was diagnosed initially in 2010. He tried cpap but was intolerant. His most recent PSG June 27, 2023 revealed an AHI of 36.4/hr with desaturations to 82%.     He is now on APAP 11-14 cm using a full face mask. Download reveals 10% compliance over the past 40 days with average nightly use 1 hour 42 min. AHI is 10.1/hr with average pressure used 11.3-12.2 cm.  Patient is using a fullface mask.  His biggest issue is nasal congestion and mouth breathes, however he is intolerant of the fullface mask.    He states that over the last 1-1/2 years, his general health has declined.  He has had several hospitalizations for UTIs or kidney stones.  He also has seen ENT for nasal congestion.  He was found to have mild deviated septum and overall normal nasal endoscopic he with no significant airway obstruction.  He spoke with ENT about inspire but states that it was not recommended.  He asked if there are any other options instead of using the CPAP.    We discussed an oral appliance.  He has full dentures but only has implants on his bottom teeth.  He is not a candidate for an oral appliance.    He does ask about the risks of stopping therapy.  We discussed severe sleep apnea, hypoxemia increasing the risk of heart disease, heart attack, stroke, arrhythmias, anxiety, depression etc.    Since he is struggling so much with his machine, we discussed changing to a nasal mask and adjusting climate control.  His temperature setting was on 82 degrees and humidity was 4.  Humidity was decreased to 2 and temperature decreased to 76 as he prefers

## 2024-10-18 ENCOUNTER — OFFICE VISIT (OUTPATIENT)
Dept: SLEEP MEDICINE | Age: 89
End: 2024-10-18
Payer: MEDICARE

## 2024-10-18 VITALS
BODY MASS INDEX: 30.77 KG/M2 | WEIGHT: 203 LBS | HEART RATE: 69 BPM | RESPIRATION RATE: 16 BRPM | OXYGEN SATURATION: 94 % | DIASTOLIC BLOOD PRESSURE: 89 MMHG | HEIGHT: 68 IN | SYSTOLIC BLOOD PRESSURE: 148 MMHG | TEMPERATURE: 97.9 F

## 2024-10-18 DIAGNOSIS — R09.81 CHRONIC NASAL CONGESTION: ICD-10-CM

## 2024-10-18 DIAGNOSIS — G47.34 NOCTURNAL HYPOXEMIA: ICD-10-CM

## 2024-10-18 DIAGNOSIS — G47.33 OSA (OBSTRUCTIVE SLEEP APNEA): Primary | ICD-10-CM

## 2024-10-18 PROCEDURE — 1123F ACP DISCUSS/DSCN MKR DOCD: CPT | Performed by: NURSE PRACTITIONER

## 2024-10-18 PROCEDURE — 1036F TOBACCO NON-USER: CPT | Performed by: NURSE PRACTITIONER

## 2024-10-18 PROCEDURE — G8484 FLU IMMUNIZE NO ADMIN: HCPCS | Performed by: NURSE PRACTITIONER

## 2024-10-18 PROCEDURE — G8417 CALC BMI ABV UP PARAM F/U: HCPCS | Performed by: NURSE PRACTITIONER

## 2024-10-18 PROCEDURE — 99215 OFFICE O/P EST HI 40 MIN: CPT | Performed by: NURSE PRACTITIONER

## 2024-10-18 PROCEDURE — G8427 DOCREV CUR MEDS BY ELIG CLIN: HCPCS | Performed by: NURSE PRACTITIONER

## 2024-10-18 ASSESSMENT — SLEEP AND FATIGUE QUESTIONNAIRES
HOW LIKELY ARE YOU TO NOD OFF OR FALL ASLEEP IN A CAR, WHILE STOPPED FOR A FEW MINUTES IN TRAFFIC: WOULD NEVER DOZE
HOW LIKELY ARE YOU TO NOD OFF OR FALL ASLEEP WHILE SITTING AND TALKING TO SOMEONE: WOULD NEVER DOZE
HOW LIKELY ARE YOU TO NOD OFF OR FALL ASLEEP WHILE SITTING QUIETLY AFTER LUNCH WITHOUT ALCOHOL: HIGH CHANCE OF DOZING
ESS TOTAL SCORE: 14
HOW LIKELY ARE YOU TO NOD OFF OR FALL ASLEEP WHILE LYING DOWN TO REST IN THE AFTERNOON WHEN CIRCUMSTANCES PERMIT: MODERATE CHANCE OF DOZING
HOW LIKELY ARE YOU TO NOD OFF OR FALL ASLEEP WHILE WATCHING TV: HIGH CHANCE OF DOZING
HOW LIKELY ARE YOU TO NOD OFF OR FALL ASLEEP WHILE SITTING AND READING: HIGH CHANCE OF DOZING
HOW LIKELY ARE YOU TO NOD OFF OR FALL ASLEEP WHILE SITTING INACTIVE IN A PUBLIC PLACE: WOULD NEVER DOZE
HOW LIKELY ARE YOU TO NOD OFF OR FALL ASLEEP WHEN YOU ARE A PASSENGER IN A CAR FOR AN HOUR WITHOUT A BREAK: HIGH CHANCE OF DOZING

## 2024-10-18 NOTE — PATIENT INSTRUCTIONS
Change pressure to APAP 11-14 cm  Change mask to nasal.    Climate control settings adjusted.    Follow up will be in 4 months or sooner if needed     How to Adjust Climate Control: Resmed S11  Adjusting climate control can make the air you breathe more comfortable, whether you like the air more cool or hot, or more moist or dry.    To adjust humidity and tube temperature, first make sure you have the tubing connected to your machine.  Change climate control from auto to manual. This will allow you to make changes to the settings.     Touch My Options.  Touch Climate Control on the menu  Change this to manual from auto by touching Manual     You will now see humidity and tube temperature options under the main menu.                How to Adjust Tube Temperature  Tube temperature ranges from 60-86 degrees F. This will default to 81 degrees.  The higher the number, the more dry and warm the air.   My Options   Tube Temp   Adjust temperature to your preference (lower the number the more cool the air. Higher the number, more warm the air). Touch the number for the temperature you want.   Then press OK              How to Adjust Humidity Level  Humidity ranges from 0-8. This will default to 4.  The lower the number, the more dry the air. The higher the number, the more moisture you'll receive in the air.  My Options  Humidity  Choose the number ranging from: off, 1 to 8   Then press OK

## 2024-10-24 ENCOUNTER — OFFICE VISIT (OUTPATIENT)
Dept: UROLOGY | Age: 89
End: 2024-10-24
Payer: MEDICARE

## 2024-10-24 DIAGNOSIS — N40.1 BPH WITH OBSTRUCTION/LOWER URINARY TRACT SYMPTOMS: Primary | ICD-10-CM

## 2024-10-24 DIAGNOSIS — N39.0 RECURRENT UTI: ICD-10-CM

## 2024-10-24 DIAGNOSIS — N13.8 BPH WITH OBSTRUCTION/LOWER URINARY TRACT SYMPTOMS: Primary | ICD-10-CM

## 2024-10-24 LAB
BILIRUBIN, URINE, POC: NEGATIVE
BLOOD URINE, POC: NEGATIVE
GLUCOSE URINE, POC: 100 MG/DL
KETONES, URINE, POC: NEGATIVE MG/DL
LEUKOCYTE ESTERASE, URINE, POC: NORMAL
NITRITE, URINE, POC: NEGATIVE
PH, URINE, POC: 5.5 (ref 4.6–8)
PROTEIN,URINE, POC: 30 MG/DL
PVR, POC: 289 CC
SPECIFIC GRAVITY, URINE, POC: 1.01 (ref 1–1.03)
URINALYSIS CLARITY, POC: NORMAL
URINALYSIS COLOR, POC: NORMAL
UROBILINOGEN, POC: NORMAL MG/DL

## 2024-10-24 PROCEDURE — 81003 URINALYSIS AUTO W/O SCOPE: CPT | Performed by: UROLOGY

## 2024-10-24 PROCEDURE — 1159F MED LIST DOCD IN RCRD: CPT | Performed by: UROLOGY

## 2024-10-24 PROCEDURE — G8427 DOCREV CUR MEDS BY ELIG CLIN: HCPCS | Performed by: UROLOGY

## 2024-10-24 PROCEDURE — G8417 CALC BMI ABV UP PARAM F/U: HCPCS | Performed by: UROLOGY

## 2024-10-24 PROCEDURE — 1036F TOBACCO NON-USER: CPT | Performed by: UROLOGY

## 2024-10-24 PROCEDURE — 51798 US URINE CAPACITY MEASURE: CPT | Performed by: UROLOGY

## 2024-10-24 PROCEDURE — 99213 OFFICE O/P EST LOW 20 MIN: CPT | Performed by: UROLOGY

## 2024-10-24 PROCEDURE — G8484 FLU IMMUNIZE NO ADMIN: HCPCS | Performed by: UROLOGY

## 2024-10-24 PROCEDURE — 1123F ACP DISCUSS/DSCN MKR DOCD: CPT | Performed by: UROLOGY

## 2024-10-24 PROCEDURE — 1160F RVW MEDS BY RX/DR IN RCRD: CPT | Performed by: UROLOGY

## 2024-10-24 RX ORDER — TAMSULOSIN HYDROCHLORIDE 0.4 MG/1
0.4 CAPSULE ORAL DAILY
Qty: 90 CAPSULE | Refills: 3 | Status: SHIPPED | OUTPATIENT
Start: 2024-10-24

## 2024-10-24 RX ORDER — FINASTERIDE 5 MG/1
5 TABLET, FILM COATED ORAL DAILY
Qty: 90 TABLET | Refills: 3 | Status: SHIPPED | OUTPATIENT
Start: 2024-10-24

## 2024-10-24 ASSESSMENT — ENCOUNTER SYMPTOMS: BACK PAIN: 0

## 2024-10-24 NOTE — PROGRESS NOTES
[facility] Urology  [address] [place] [address]  [phone]          Schuyler Robins  : 1935    Chief Complaint   Patient presents with    Follow-up     6 mo          HPI     Schuyler Robins is a 89 y.o. male with h/o bph and recurrent uti's who presented to [facility] 10/28/22 and was found to have UTI and 5 m LEFT proximal ureteral stone.  LEFT ureteral stent was placed urgency 10/29/22 and he is currently scheduled for cystoscopy, LEFT ureteroscopy, laser lithotripsy, LEFT ureteral stent exchange with Dr. Brasher on 22.       He has had a urolift in the past and has recurrent uti's.  Cystoscopy at time of cystoscopy, LEFT ureteroscopy, laser lithotripsy, LEFT ureteral stent exchange 22 revealed a prostate that would be amenable to TURP.       He is thrilled in that he has had NO UTI since last visit.  LUTS have improved.  He is on tamsulosin/finasteride.       He was admitted with uti at the end of .       PVR: 22 407 ml,  330 ml,  241 ml, 10/24 289 ml          Past Medical History:   Diagnosis Date    BPH (benign prostatic hyperplasia)     Chronic bacterial prostatitis     Chronic kidney disease (CKD), stage III (moderate) ([facility])     Deviated nasal septum     Hearing loss sensory, bilateral     Hypertension     Hypertrophy of nasal turbinates     Kidney stone  removed    SARAVANAN (obstructive sleep apnea)     Subdural hematoma     Type 2 diabetes mellitus ([facility])     no medicaitons    Ureteral stone     [facility]      Vestibular disequilibrium involving both inner ears      Past Surgical History:   Procedure Laterality Date    CATARACT EXTRACTION  2016    COLONOSCOPY      CYSTOSCOPY Left 2022    CYSTOSCOPY,LEFT URETEROSCOPY, LASER LITHOTRIPSY, LEFT URETERAL STENT EXCHANGE performed by Bernabe Neal MD at [facility] MAIN OR    CYSTOURETHROSCOPY  10/29/2022    ELBOW SURGERY

## 2024-11-20 ENCOUNTER — OFFICE VISIT (OUTPATIENT)
Age: 89
End: 2024-11-20
Payer: MEDICARE

## 2024-11-20 VITALS
DIASTOLIC BLOOD PRESSURE: 70 MMHG | WEIGHT: 203 LBS | SYSTOLIC BLOOD PRESSURE: 128 MMHG | HEART RATE: 63 BPM | OXYGEN SATURATION: 96 % | HEIGHT: 68 IN | BODY MASS INDEX: 30.77 KG/M2

## 2024-11-20 DIAGNOSIS — G47.33 OSA (OBSTRUCTIVE SLEEP APNEA): ICD-10-CM

## 2024-11-20 DIAGNOSIS — R06.00 PND (PAROXYSMAL NOCTURNAL DYSPNEA): ICD-10-CM

## 2024-11-20 DIAGNOSIS — I10 PRIMARY HYPERTENSION: Primary | ICD-10-CM

## 2024-11-20 DIAGNOSIS — R60.0 LOCALIZED EDEMA: ICD-10-CM

## 2024-11-20 PROCEDURE — 1126F AMNT PAIN NOTED NONE PRSNT: CPT | Performed by: INTERNAL MEDICINE

## 2024-11-20 PROCEDURE — G8428 CUR MEDS NOT DOCUMENT: HCPCS | Performed by: INTERNAL MEDICINE

## 2024-11-20 PROCEDURE — G8484 FLU IMMUNIZE NO ADMIN: HCPCS | Performed by: INTERNAL MEDICINE

## 2024-11-20 PROCEDURE — 1123F ACP DISCUSS/DSCN MKR DOCD: CPT | Performed by: INTERNAL MEDICINE

## 2024-11-20 PROCEDURE — G8417 CALC BMI ABV UP PARAM F/U: HCPCS | Performed by: INTERNAL MEDICINE

## 2024-11-20 PROCEDURE — 99214 OFFICE O/P EST MOD 30 MIN: CPT | Performed by: INTERNAL MEDICINE

## 2024-11-20 PROCEDURE — 1036F TOBACCO NON-USER: CPT | Performed by: INTERNAL MEDICINE

## 2024-11-20 RX ORDER — IRBESARTAN 150 MG/1
150 TABLET ORAL DAILY
COMMUNITY
Start: 2024-11-08

## 2024-11-20 ASSESSMENT — ENCOUNTER SYMPTOMS
SHORTNESS OF BREATH: 0
ABDOMINAL PAIN: 0

## 2024-11-20 NOTE — PROGRESS NOTES
[facility] CARDIOLOGY  [address] [place] [address], [address]  [place] [address]  PHONE: [phone]      24    NAME:  Schuyler Robins  : 1935  MRN: [id number]         SUBJECTIVE:   Schuyler Robins is a 89 y.o. male seen for a follow up visit regarding the following:     Chief Complaint   Patient presents with    Hypertension            HPI:  Follow up  Hypertension   .    Mr. Robins presents today for follow-up.  He is doing well from a cardiac standpoint today.  Has a lot of questions regarding his CPAP and sleep apnea.  He has difficulty wearing his CPAP.  He is concerned about the risks to his heart if he is not compliant with CPAP therapy.  Has no cardiac complaints today.  Had an echocardiogram and a stress test done last year, both of us were normal.  He denies chest pain, shortness of breath, orthopnea, PND, palpitations, syncope or lower extremity swelling.    Hypertension  Pertinent negatives include no shortness of breath.           Cardiac Medications       Calcium Channel Blockers       amLODIPine (NORVASC) 10 MG tablet Take by mouth daily     Patient not taking: Reported on 2024       Loop Diuretics       torsemide (DEMADEX) 20 MG tablet TAKE 1 TABLET BY MOUTH EVERY DAY     Patient not taking: Reported on 2024       Angiotensin II Receptor Antagonists       irbesartan (AVAPRO) 150 MG tablet Take 1 tablet by mouth daily                  Past Medical History, Past Surgical History, Family history, Social History, and Medications were all reviewed with the patient today and updated as necessary.     Prior to Admission medications    Medication Sig Start Date End Date Taking? Authorizing Provider   irbesartan (AVAPRO) 150 MG tablet Take 1 tablet by mouth daily 24  Yes ProviderShamir MD   finasteride (PROSCAR) 5 MG tablet Take 1 tablet by mouth daily 10/24/24  Yes Bernabe Neal MD   tamsulosin (FLOMAX) 0.4 MG capsule Take 1 capsule by mouth daily 10/24/24   normal...

## 2025-04-30 ENCOUNTER — OFFICE VISIT (OUTPATIENT)
Dept: UROLOGY | Age: 89
End: 2025-04-30
Payer: MEDICARE

## 2025-04-30 DIAGNOSIS — N40.1 BPH WITH OBSTRUCTION/LOWER URINARY TRACT SYMPTOMS: Primary | ICD-10-CM

## 2025-04-30 DIAGNOSIS — N13.8 BPH WITH OBSTRUCTION/LOWER URINARY TRACT SYMPTOMS: Primary | ICD-10-CM

## 2025-04-30 DIAGNOSIS — N39.0 RECURRENT UTI: ICD-10-CM

## 2025-04-30 LAB
BILIRUBIN, URINE, POC: NEGATIVE
BLOOD URINE, POC: NEGATIVE
GLUCOSE URINE, POC: 100 MG/DL
KETONES, URINE, POC: NEGATIVE MG/DL
LEUKOCYTE ESTERASE, URINE, POC: NORMAL
NITRITE, URINE, POC: NEGATIVE
PH, URINE, POC: 5.5 (ref 4.6–8)
PROTEIN,URINE, POC: 30 MG/DL
PVR, POC: 374 CC
SPECIFIC GRAVITY, URINE, POC: 1.01 (ref 1–1.03)
URINALYSIS CLARITY, POC: NORMAL
URINALYSIS COLOR, POC: NORMAL
UROBILINOGEN, POC: NORMAL MG/DL

## 2025-04-30 PROCEDURE — 1036F TOBACCO NON-USER: CPT | Performed by: UROLOGY

## 2025-04-30 PROCEDURE — 99213 OFFICE O/P EST LOW 20 MIN: CPT | Performed by: UROLOGY

## 2025-04-30 PROCEDURE — 81003 URINALYSIS AUTO W/O SCOPE: CPT | Performed by: UROLOGY

## 2025-04-30 PROCEDURE — 1160F RVW MEDS BY RX/DR IN RCRD: CPT | Performed by: UROLOGY

## 2025-04-30 PROCEDURE — 51798 US URINE CAPACITY MEASURE: CPT | Performed by: UROLOGY

## 2025-04-30 PROCEDURE — G8417 CALC BMI ABV UP PARAM F/U: HCPCS | Performed by: UROLOGY

## 2025-04-30 PROCEDURE — 1159F MED LIST DOCD IN RCRD: CPT | Performed by: UROLOGY

## 2025-04-30 PROCEDURE — 1123F ACP DISCUSS/DSCN MKR DOCD: CPT | Performed by: UROLOGY

## 2025-04-30 PROCEDURE — G8427 DOCREV CUR MEDS BY ELIG CLIN: HCPCS | Performed by: UROLOGY

## 2025-04-30 RX ORDER — FINASTERIDE 5 MG/1
5 TABLET, FILM COATED ORAL DAILY
Qty: 90 TABLET | Refills: 3 | Status: SHIPPED | OUTPATIENT
Start: 2025-04-30

## 2025-04-30 RX ORDER — TAMSULOSIN HYDROCHLORIDE 0.4 MG/1
0.4 CAPSULE ORAL DAILY
Qty: 90 CAPSULE | Refills: 3 | Status: SHIPPED | OUTPATIENT
Start: 2025-04-30

## 2025-04-30 ASSESSMENT — ENCOUNTER SYMPTOMS: BACK PAIN: 0

## 2025-04-30 NOTE — PROGRESS NOTES
[facility] Urology  [address] [place] [address]  [phone]          Schuyler Robins  : 1935    Chief Complaint   Patient presents with    Follow-up     6 mo          HPI     Schuyler Robins is a 89 y.o. malewith h/o bph and recurrent uti's who presented to [facility] 10/28/22 and was found to have UTI and 5 m LEFT proximal ureteral stone.  LEFT ureteral stent was placed urgency 10/29/22 and he is currently scheduled for cystoscopy, LEFT ureteroscopy, laser lithotripsy, LEFT ureteral stent exchange with Dr. Brasher on 22.       He has had a urolift in the past and has recurrent uti's.  Cystoscopy at time of cystoscopy, LEFT ureteroscopy, laser lithotripsy, LEFT ureteral stent exchange 22 revealed a prostate that would be amenable to TURP.       He is thrilled in that his last UTI was in .  LUTS have improved.  He is on tamsulosin/finasteride.       PVR: 22 407 ml,  330 ml,  241 ml, 10/24 289 ml,  374 ml             Past Medical History:   Diagnosis Date    BPH (benign prostatic hyperplasia)     Chronic bacterial prostatitis     Chronic kidney disease (CKD), stage III (moderate) (HCC)     Deviated nasal septum     Hearing loss sensory, bilateral     Hypertension     Hypertrophy of nasal turbinates     Kidney stone  removed    SARAVANAN (obstructive sleep apnea)     Subdural hematoma ([facility])     Type 2 diabetes mellitus ([facility])     no medicaitons    Ureteral stone     UTI (urinary tract infection) persisstent     [facility]      Vestibular disequilibrium involving both inner ears      Past Surgical History:   Procedure Laterality Date    CATARACT EXTRACTION  2016    COLONOSCOPY      CYSTOSCOPY Left 2022    CYSTOSCOPY,LEFT URETEROSCOPY, LASER LITHOTRIPSY, LEFT URETERAL STENT EXCHANGE performed by Bernabe Neal MD at [facility] MAIN OR    CYSTOURETHROSCOPY  10/29/2022    ELBOW SURGERY      KIDNEY STONE SURGERY  Dec 2, 2022

## 2025-07-17 ENCOUNTER — OFFICE VISIT (OUTPATIENT)
Dept: ENDOCRINOLOGY | Age: 89
End: 2025-07-17
Payer: MEDICARE

## 2025-07-17 VITALS
OXYGEN SATURATION: 96 % | HEIGHT: 68 IN | RESPIRATION RATE: 18 BRPM | BODY MASS INDEX: 30.92 KG/M2 | HEART RATE: 72 BPM | WEIGHT: 204 LBS | DIASTOLIC BLOOD PRESSURE: 88 MMHG | SYSTOLIC BLOOD PRESSURE: 152 MMHG

## 2025-07-17 DIAGNOSIS — E03.9 PRIMARY HYPOTHYROIDISM: Primary | ICD-10-CM

## 2025-07-17 DIAGNOSIS — E06.3 HASHIMOTO'S THYROIDITIS: ICD-10-CM

## 2025-07-17 DIAGNOSIS — E03.9 PRIMARY HYPOTHYROIDISM: ICD-10-CM

## 2025-07-17 DIAGNOSIS — E04.1 THYROID NODULE: ICD-10-CM

## 2025-07-17 LAB
T4 FREE SERPL-MCNC: 1.6 NG/DL (ref 0.9–1.7)
TSH W FREE THYROID IF ABNORMAL: 7.65 UIU/ML (ref 0.27–4.2)

## 2025-07-17 PROCEDURE — 1123F ACP DISCUSS/DSCN MKR DOCD: CPT | Performed by: INTERNAL MEDICINE

## 2025-07-17 PROCEDURE — 1159F MED LIST DOCD IN RCRD: CPT | Performed by: INTERNAL MEDICINE

## 2025-07-17 PROCEDURE — 1036F TOBACCO NON-USER: CPT | Performed by: INTERNAL MEDICINE

## 2025-07-17 PROCEDURE — G8417 CALC BMI ABV UP PARAM F/U: HCPCS | Performed by: INTERNAL MEDICINE

## 2025-07-17 PROCEDURE — 99214 OFFICE O/P EST MOD 30 MIN: CPT | Performed by: INTERNAL MEDICINE

## 2025-07-17 PROCEDURE — 76536 US EXAM OF HEAD AND NECK: CPT | Performed by: INTERNAL MEDICINE

## 2025-07-17 PROCEDURE — G8427 DOCREV CUR MEDS BY ELIG CLIN: HCPCS | Performed by: INTERNAL MEDICINE

## 2025-07-17 RX ORDER — LEVOTHYROXINE SODIUM 137 UG/1
137 TABLET ORAL DAILY
Qty: 90 TABLET | Refills: 3 | Status: SHIPPED | OUTPATIENT
Start: 2025-07-17

## 2025-07-17 ASSESSMENT — ENCOUNTER SYMPTOMS
CONSTIPATION: 0
DIARRHEA: 0

## 2025-07-17 NOTE — PROGRESS NOTES
Dl Abreu MD, FACE  [facility]ocrin[facility]  [address] [place] [address], [facility] [address]  [place] [address]  Phone [phone]  Facsimile [phone]          Schuyler Robins is a 89 y.o. male seen 7/17/2025 for follow up of hypothyroidism (has not been seen since 3/2024)        ASSESSMENT AND PLAN:    1. Primary hypothyroidism  His hypothyroidism has not been well-controlled.  I discovered today that he has not been taking his levothyroxine correctly and he was instructed in the proper way to take it.  Follow-up in 3 months.      - levothyroxine (SYNTHROID) 137 MCG tablet; Take 1 tablet by mouth daily  Dispense: 90 tablet; Refill: 3    2. Hashimoto's thyroiditis  Based on his thyroid ultrasound appearance and positive thyroid peroxidase antibodies, he has Hashimoto's thyroiditis.     3. Thyroid nodule  Thyroid ultrasound performed today revealed that the subcentimeter nodule in the superior right lobe was stable to slightly smaller in size.  No further imaging surveillance is necessary per ACR TI-RADS guidelines.          Procedures:    Thyroid ultrasound 7/17/2025: Right lobe 2.01 x 1.64 x 5.30 cm, markedly heterogeneous echotexture.  In the superior right lobe posteriorly there is a possible hypoechoic nodule measuring 0.43 x 0.68 cm (TR 4).  Isthmus measures 0.66 cm.  Left lobe 1.78 x 1.79 x 4.23 cm, heterogeneous echotexture, no obvious nodules.      Follow-up and Dispositions    Return in about 3 months (around 10/17/2025), or Friday afternoon is okay.         HISTORY OF PRESENT ILLNESS:    THYROID DISEASE    Presentation/Diagnosis: Elevated TSH noted on routine lab screen.  He has stage 4 chronic kidney disease.    Symptoms: See review of systems.     Treatment: Takes generic in AM with other medications and food.    Imaging:   Neck ultrasound 3/21/2023 ([facility]): Few scattered normal-appearing small lymph nodes in the left soft tissue neck. No suspicious mass or fluid

## (undated) DEVICE — FLEXIVA  PULSE  AND  FLEXIVA  PULSE  TRACTIP  LASER  FIBERS  ARE  HIGH  POWER  SINGLE-USE FIBER: Brand: FLEXIVA PULSE

## (undated) DEVICE — GLOVE ORANGE PI 8 1/2   MSG9085

## (undated) DEVICE — SINGLE-USE DIGITAL FLEXIBLE URETEROSCOPE: Brand: LITHOVUE

## (undated) DEVICE — PACK SURGICAL PROCEDURE KIT CYSTOSCOPY TOTE

## (undated) DEVICE — GARMENT,MEDLINE,DVT,INT,CALF,MED, GEN2: Brand: MEDLINE

## (undated) DEVICE — GOWN,REINFORCED,POLY,AURORA,XXLARGE,STR: Brand: MEDLINE

## (undated) DEVICE — SOLUTION IRRIG 3000ML H2O STRL BAG

## (undated) DEVICE — DILATOR/SHEATH SET: Brand: 8/10 DILATOR/SHEATH SET

## (undated) DEVICE — RADIFOCUS GLIDEWIRE: Brand: GLIDEWIRE